# Patient Record
Sex: MALE | Race: WHITE | NOT HISPANIC OR LATINO | Employment: OTHER | URBAN - NONMETROPOLITAN AREA
[De-identification: names, ages, dates, MRNs, and addresses within clinical notes are randomized per-mention and may not be internally consistent; named-entity substitution may affect disease eponyms.]

---

## 2020-10-25 ENCOUNTER — APPOINTMENT (OUTPATIENT)
Dept: GENERAL RADIOLOGY | Facility: HOSPITAL | Age: 80
End: 2020-10-25

## 2020-10-25 ENCOUNTER — HOSPITAL ENCOUNTER (INPATIENT)
Facility: HOSPITAL | Age: 80
LOS: 5 days | Discharge: HOME HEALTH | End: 2020-10-30
Attending: EMERGENCY MEDICINE | Admitting: INTERNAL MEDICINE

## 2020-10-25 DIAGNOSIS — Z74.09 IMPAIRED FUNCTIONAL MOBILITY, BALANCE, GAIT, AND ENDURANCE: ICD-10-CM

## 2020-10-25 DIAGNOSIS — E83.42 HYPOMAGNESEMIA: ICD-10-CM

## 2020-10-25 DIAGNOSIS — Z78.9 IMPAIRED MOBILITY AND ACTIVITIES OF DAILY LIVING: ICD-10-CM

## 2020-10-25 DIAGNOSIS — Z74.09 IMPAIRED MOBILITY AND ACTIVITIES OF DAILY LIVING: ICD-10-CM

## 2020-10-25 DIAGNOSIS — N18.9 CHRONIC KIDNEY DISEASE, UNSPECIFIED CKD STAGE: ICD-10-CM

## 2020-10-25 DIAGNOSIS — D64.9 ANEMIA, UNSPECIFIED TYPE: ICD-10-CM

## 2020-10-25 DIAGNOSIS — R77.8 ELEVATED TROPONIN: ICD-10-CM

## 2020-10-25 DIAGNOSIS — J18.9 PNEUMONIA OF BOTH LUNGS DUE TO INFECTIOUS ORGANISM, UNSPECIFIED PART OF LUNG: Primary | ICD-10-CM

## 2020-10-25 PROBLEM — R06.00 DYSPNEA: Chronic | Status: ACTIVE | Noted: 2020-10-25

## 2020-10-25 LAB
ABO GROUP BLD: NORMAL
ALBUMIN SERPL-MCNC: 3 G/DL (ref 3.5–5.2)
ALBUMIN/GLOB SERPL: 1 G/DL
ALP SERPL-CCNC: 64 U/L (ref 39–117)
ALT SERPL W P-5'-P-CCNC: 12 U/L (ref 1–41)
ANION GAP SERPL CALCULATED.3IONS-SCNC: 12 MMOL/L (ref 5–15)
APTT PPP: 35.1 SECONDS (ref 20–40.3)
AST SERPL-CCNC: 14 U/L (ref 1–40)
BACTERIA UR QL AUTO: ABNORMAL /HPF
BASOPHILS # BLD AUTO: 0.03 10*3/MM3 (ref 0–0.2)
BASOPHILS NFR BLD AUTO: 0.3 % (ref 0–1.5)
BILIRUB SERPL-MCNC: 0.5 MG/DL (ref 0–1.2)
BILIRUB UR QL STRIP: NEGATIVE
BLD GP AB SCN SERPL QL: NEGATIVE
BUN SERPL-MCNC: 33 MG/DL (ref 8–23)
BUN/CREAT SERPL: 17.3 (ref 7–25)
CALCIUM SPEC-SCNC: 8.5 MG/DL (ref 8.6–10.5)
CHLORIDE SERPL-SCNC: 99 MMOL/L (ref 98–107)
CLARITY UR: ABNORMAL
CO2 SERPL-SCNC: 24 MMOL/L (ref 22–29)
COLOR UR: YELLOW
CREAT SERPL-MCNC: 1.91 MG/DL (ref 0.76–1.27)
D-LACTATE SERPL-SCNC: 1 MMOL/L (ref 0.5–2)
DEPRECATED RDW RBC AUTO: 51.7 FL (ref 37–54)
EOSINOPHIL # BLD AUTO: 0.09 10*3/MM3 (ref 0–0.4)
EOSINOPHIL NFR BLD AUTO: 0.9 % (ref 0.3–6.2)
ERYTHROCYTE [DISTWIDTH] IN BLOOD BY AUTOMATED COUNT: 16.1 % (ref 12.3–15.4)
GFR SERPL CREATININE-BSD FRML MDRD: 34 ML/MIN/1.73
GLOBULIN UR ELPH-MCNC: 3.1 GM/DL
GLUCOSE SERPL-MCNC: 191 MG/DL (ref 65–99)
GLUCOSE UR STRIP-MCNC: NEGATIVE MG/DL
HCT VFR BLD AUTO: 27.1 % (ref 37.5–51)
HGB BLD-MCNC: 8.6 G/DL (ref 13–17.7)
HGB UR QL STRIP.AUTO: NEGATIVE
HOLD SPECIMEN: NORMAL
HOLD SPECIMEN: NORMAL
HYALINE CASTS UR QL AUTO: ABNORMAL /LPF
IMM GRANULOCYTES # BLD AUTO: 0.05 10*3/MM3 (ref 0–0.05)
IMM GRANULOCYTES NFR BLD AUTO: 0.5 % (ref 0–0.5)
INR PPP: 1.14 (ref 0.8–1.2)
IRON 24H UR-MRATE: 13 MCG/DL (ref 59–158)
KETONES UR QL STRIP: NEGATIVE
LEUKOCYTE ESTERASE UR QL STRIP.AUTO: NEGATIVE
LIPASE SERPL-CCNC: 18 U/L (ref 13–60)
LYMPHOCYTES # BLD AUTO: 1 10*3/MM3 (ref 0.7–3.1)
LYMPHOCYTES NFR BLD AUTO: 9.6 % (ref 19.6–45.3)
Lab: NORMAL
MAGNESIUM SERPL-MCNC: 1.1 MG/DL (ref 1.6–2.4)
MCH RBC QN AUTO: 27.8 PG (ref 26.6–33)
MCHC RBC AUTO-ENTMCNC: 31.7 G/DL (ref 31.5–35.7)
MCV RBC AUTO: 87.7 FL (ref 79–97)
MONOCYTES # BLD AUTO: 0.44 10*3/MM3 (ref 0.1–0.9)
MONOCYTES NFR BLD AUTO: 4.2 % (ref 5–12)
NEUTROPHILS NFR BLD AUTO: 8.76 10*3/MM3 (ref 1.7–7)
NEUTROPHILS NFR BLD AUTO: 84.5 % (ref 42.7–76)
NITRITE UR QL STRIP: NEGATIVE
NRBC BLD AUTO-RTO: 0 /100 WBC (ref 0–0.2)
NT-PROBNP SERPL-MCNC: 8814 PG/ML (ref 0–1800)
PH UR STRIP.AUTO: <=5 [PH] (ref 5–9)
PLATELET # BLD AUTO: 311 10*3/MM3 (ref 140–450)
PMV BLD AUTO: 10.1 FL (ref 6–12)
POTASSIUM SERPL-SCNC: 4.5 MMOL/L (ref 3.5–5.2)
PROT SERPL-MCNC: 6.1 G/DL (ref 6–8.5)
PROT UR QL STRIP: ABNORMAL
PROTHROMBIN TIME: 15.1 SECONDS (ref 11.1–15.3)
RBC # BLD AUTO: 3.09 10*6/MM3 (ref 4.14–5.8)
RBC # UR: ABNORMAL /HPF
REF LAB TEST METHOD: ABNORMAL
RH BLD: POSITIVE
SARS-COV-2 N GENE RESP QL NAA+PROBE: NOT DETECTED
SODIUM SERPL-SCNC: 135 MMOL/L (ref 136–145)
SP GR UR STRIP: 1.01 (ref 1–1.03)
SQUAMOUS #/AREA URNS HPF: ABNORMAL /HPF
T&S EXPIRATION DATE: NORMAL
TROPONIN T SERPL-MCNC: 0.07 NG/ML (ref 0–0.03)
TSH SERPL DL<=0.05 MIU/L-ACNC: 1.47 UIU/ML (ref 0.27–4.2)
UROBILINOGEN UR QL STRIP: ABNORMAL
WBC # BLD AUTO: 10.37 10*3/MM3 (ref 3.4–10.8)
WBC UR QL AUTO: ABNORMAL /HPF
WHOLE BLOOD HOLD SPECIMEN: NORMAL
WHOLE BLOOD HOLD SPECIMEN: NORMAL

## 2020-10-25 PROCEDURE — 83880 ASSAY OF NATRIURETIC PEPTIDE: CPT | Performed by: EMERGENCY MEDICINE

## 2020-10-25 PROCEDURE — 25010000002 CEFTRIAXONE PER 250 MG: Performed by: INTERNAL MEDICINE

## 2020-10-25 PROCEDURE — 87150 DNA/RNA AMPLIFIED PROBE: CPT | Performed by: EMERGENCY MEDICINE

## 2020-10-25 PROCEDURE — 83690 ASSAY OF LIPASE: CPT | Performed by: EMERGENCY MEDICINE

## 2020-10-25 PROCEDURE — 87040 BLOOD CULTURE FOR BACTERIA: CPT | Performed by: INTERNAL MEDICINE

## 2020-10-25 PROCEDURE — 93005 ELECTROCARDIOGRAM TRACING: CPT | Performed by: EMERGENCY MEDICINE

## 2020-10-25 PROCEDURE — 36415 COLL VENOUS BLD VENIPUNCTURE: CPT | Performed by: EMERGENCY MEDICINE

## 2020-10-25 PROCEDURE — 85730 THROMBOPLASTIN TIME PARTIAL: CPT | Performed by: EMERGENCY MEDICINE

## 2020-10-25 PROCEDURE — 71045 X-RAY EXAM CHEST 1 VIEW: CPT

## 2020-10-25 PROCEDURE — 82962 GLUCOSE BLOOD TEST: CPT

## 2020-10-25 PROCEDURE — 86901 BLOOD TYPING SEROLOGIC RH(D): CPT | Performed by: EMERGENCY MEDICINE

## 2020-10-25 PROCEDURE — 85610 PROTHROMBIN TIME: CPT | Performed by: EMERGENCY MEDICINE

## 2020-10-25 PROCEDURE — 85025 COMPLETE CBC W/AUTO DIFF WBC: CPT

## 2020-10-25 PROCEDURE — 81001 URINALYSIS AUTO W/SCOPE: CPT | Performed by: EMERGENCY MEDICINE

## 2020-10-25 PROCEDURE — 87186 SC STD MICRODIL/AGAR DIL: CPT | Performed by: EMERGENCY MEDICINE

## 2020-10-25 PROCEDURE — 87635 SARS-COV-2 COVID-19 AMP PRB: CPT | Performed by: EMERGENCY MEDICINE

## 2020-10-25 PROCEDURE — 84443 ASSAY THYROID STIM HORMONE: CPT | Performed by: INTERNAL MEDICINE

## 2020-10-25 PROCEDURE — 86850 RBC ANTIBODY SCREEN: CPT | Performed by: EMERGENCY MEDICINE

## 2020-10-25 PROCEDURE — 83605 ASSAY OF LACTIC ACID: CPT | Performed by: EMERGENCY MEDICINE

## 2020-10-25 PROCEDURE — 63710000001 INSULIN ASPART PER 5 UNITS: Performed by: INTERNAL MEDICINE

## 2020-10-25 PROCEDURE — 25010000002 ALBUMIN HUMAN 25% PER 50 ML: Performed by: INTERNAL MEDICINE

## 2020-10-25 PROCEDURE — 83540 ASSAY OF IRON: CPT | Performed by: INTERNAL MEDICINE

## 2020-10-25 PROCEDURE — 84484 ASSAY OF TROPONIN QUANT: CPT | Performed by: EMERGENCY MEDICINE

## 2020-10-25 PROCEDURE — 25010000002 MAGNESIUM SULFATE 2 GM/50ML SOLUTION: Performed by: EMERGENCY MEDICINE

## 2020-10-25 PROCEDURE — 87040 BLOOD CULTURE FOR BACTERIA: CPT | Performed by: EMERGENCY MEDICINE

## 2020-10-25 PROCEDURE — 93010 ELECTROCARDIOGRAM REPORT: CPT | Performed by: INTERNAL MEDICINE

## 2020-10-25 PROCEDURE — P9047 ALBUMIN (HUMAN), 25%, 50ML: HCPCS | Performed by: INTERNAL MEDICINE

## 2020-10-25 PROCEDURE — 86900 BLOOD TYPING SEROLOGIC ABO: CPT | Performed by: EMERGENCY MEDICINE

## 2020-10-25 PROCEDURE — 86900 BLOOD TYPING SEROLOGIC ABO: CPT

## 2020-10-25 PROCEDURE — 86901 BLOOD TYPING SEROLOGIC RH(D): CPT

## 2020-10-25 PROCEDURE — 94799 UNLISTED PULMONARY SVC/PX: CPT

## 2020-10-25 PROCEDURE — 83735 ASSAY OF MAGNESIUM: CPT | Performed by: EMERGENCY MEDICINE

## 2020-10-25 PROCEDURE — 25010000002 AZITHROMYCIN 500 MG/250 ML: Performed by: EMERGENCY MEDICINE

## 2020-10-25 PROCEDURE — 25010000002 FUROSEMIDE PER 20 MG: Performed by: INTERNAL MEDICINE

## 2020-10-25 PROCEDURE — 80053 COMPREHEN METABOLIC PANEL: CPT | Performed by: EMERGENCY MEDICINE

## 2020-10-25 PROCEDURE — 99284 EMERGENCY DEPT VISIT MOD MDM: CPT

## 2020-10-25 RX ORDER — TAMSULOSIN HYDROCHLORIDE 0.4 MG/1
0.4 CAPSULE ORAL
COMMUNITY

## 2020-10-25 RX ORDER — NYSTATIN 100000 [USP'U]/G
POWDER TOPICAL EVERY 12 HOURS SCHEDULED
Status: DISCONTINUED | OUTPATIENT
Start: 2020-10-25 | End: 2020-10-30 | Stop reason: HOSPADM

## 2020-10-25 RX ORDER — MULTIVITAMIN
1 CAPSULE ORAL
COMMUNITY

## 2020-10-25 RX ORDER — NICOTINE POLACRILEX 4 MG
15 LOZENGE BUCCAL
Status: DISCONTINUED | OUTPATIENT
Start: 2020-10-25 | End: 2020-10-30 | Stop reason: HOSPADM

## 2020-10-25 RX ORDER — ASPIRIN 81 MG/1
81 TABLET, CHEWABLE ORAL ONCE
Status: COMPLETED | OUTPATIENT
Start: 2020-10-26 | End: 2020-10-26

## 2020-10-25 RX ORDER — SODIUM CHLORIDE 0.9 % (FLUSH) 0.9 %
10 SYRINGE (ML) INJECTION AS NEEDED
Status: DISCONTINUED | OUTPATIENT
Start: 2020-10-25 | End: 2020-10-30 | Stop reason: HOSPADM

## 2020-10-25 RX ORDER — FLUTICASONE PROPIONATE 220 UG/1
1 AEROSOL, METERED RESPIRATORY (INHALATION)
COMMUNITY

## 2020-10-25 RX ORDER — SODIUM CHLORIDE 9 MG/ML
75 INJECTION, SOLUTION INTRAVENOUS CONTINUOUS
Status: DISCONTINUED | OUTPATIENT
Start: 2020-10-25 | End: 2020-10-25

## 2020-10-25 RX ORDER — LOSARTAN POTASSIUM 25 MG/1
25 TABLET ORAL
COMMUNITY

## 2020-10-25 RX ORDER — TORSEMIDE 10 MG/1
10 TABLET ORAL
COMMUNITY

## 2020-10-25 RX ORDER — THIAMINE MONONITRATE (VIT B1) 100 MG
100 TABLET ORAL DAILY
COMMUNITY

## 2020-10-25 RX ORDER — ASPIRIN 81 MG/1
324 TABLET, CHEWABLE ORAL ONCE
Status: COMPLETED | OUTPATIENT
Start: 2020-10-25 | End: 2020-10-25

## 2020-10-25 RX ORDER — SIMVASTATIN 20 MG
20 TABLET ORAL
COMMUNITY

## 2020-10-25 RX ORDER — SODIUM CHLORIDE 9 MG/ML
INJECTION, SOLUTION INTRAVENOUS
Status: COMPLETED
Start: 2020-10-25 | End: 2020-10-25

## 2020-10-25 RX ORDER — MAGNESIUM SULFATE HEPTAHYDRATE 40 MG/ML
2 INJECTION, SOLUTION INTRAVENOUS ONCE
Status: COMPLETED | OUTPATIENT
Start: 2020-10-25 | End: 2020-10-25

## 2020-10-25 RX ORDER — TAMSULOSIN HYDROCHLORIDE 0.4 MG/1
0.4 CAPSULE ORAL DAILY
Status: DISCONTINUED | OUTPATIENT
Start: 2020-10-25 | End: 2020-10-30 | Stop reason: HOSPADM

## 2020-10-25 RX ORDER — PANTOPRAZOLE SODIUM 40 MG/1
40 TABLET, DELAYED RELEASE ORAL
COMMUNITY
Start: 2020-03-13 | End: 2021-03-14

## 2020-10-25 RX ORDER — ALBUMIN (HUMAN) 12.5 G/50ML
12.5 SOLUTION INTRAVENOUS ONCE
Status: COMPLETED | OUTPATIENT
Start: 2020-10-25 | End: 2020-10-25

## 2020-10-25 RX ORDER — SODIUM CHLORIDE 0.9 % (FLUSH) 0.9 %
10 SYRINGE (ML) INJECTION EVERY 12 HOURS SCHEDULED
Status: DISCONTINUED | OUTPATIENT
Start: 2020-10-25 | End: 2020-10-30 | Stop reason: HOSPADM

## 2020-10-25 RX ORDER — ONDANSETRON 2 MG/ML
4 INJECTION INTRAMUSCULAR; INTRAVENOUS EVERY 6 HOURS PRN
Status: DISCONTINUED | OUTPATIENT
Start: 2020-10-25 | End: 2020-10-30 | Stop reason: HOSPADM

## 2020-10-25 RX ORDER — LEVALBUTEROL TARTRATE 45 UG/1
1-2 AEROSOL, METERED ORAL EVERY 6 HOURS PRN
COMMUNITY

## 2020-10-25 RX ORDER — DEXTROSE MONOHYDRATE 25 G/50ML
25 INJECTION, SOLUTION INTRAVENOUS
Status: DISCONTINUED | OUTPATIENT
Start: 2020-10-25 | End: 2020-10-30 | Stop reason: HOSPADM

## 2020-10-25 RX ORDER — CODEINE PHOSPHATE AND GUAIFENESIN 10; 100 MG/5ML; MG/5ML
5 SOLUTION ORAL 3 TIMES DAILY PRN
COMMUNITY
End: 2020-11-03 | Stop reason: SDUPTHER

## 2020-10-25 RX ORDER — NITROGLYCERIN 0.4 MG/1
0.4 TABLET SUBLINGUAL
COMMUNITY

## 2020-10-25 RX ORDER — FUROSEMIDE 10 MG/ML
20 INJECTION INTRAMUSCULAR; INTRAVENOUS ONCE
Status: COMPLETED | OUTPATIENT
Start: 2020-10-25 | End: 2020-10-25

## 2020-10-25 RX ADMIN — SODIUM CHLORIDE 75 ML/HR: 900 INJECTION, SOLUTION INTRAVENOUS at 14:20

## 2020-10-25 RX ADMIN — SODIUM CHLORIDE, PRESERVATIVE FREE 10 ML: 5 INJECTION INTRAVENOUS at 20:18

## 2020-10-25 RX ADMIN — SODIUM CHLORIDE 500 ML: 9 INJECTION, SOLUTION INTRAVENOUS at 15:29

## 2020-10-25 RX ADMIN — ALBUMIN HUMAN 12.5 G: 0.25 SOLUTION INTRAVENOUS at 21:46

## 2020-10-25 RX ADMIN — FUROSEMIDE 20 MG: 10 INJECTION, SOLUTION INTRAMUSCULAR; INTRAVENOUS at 21:44

## 2020-10-25 RX ADMIN — SODIUM CHLORIDE 250 ML: 9 INJECTION, SOLUTION INTRAVENOUS at 20:49

## 2020-10-25 RX ADMIN — Medication: at 22:00

## 2020-10-25 RX ADMIN — MAGNESIUM SULFATE HEPTAHYDRATE 2 G: 40 INJECTION, SOLUTION INTRAVENOUS at 14:20

## 2020-10-25 RX ADMIN — AZITHROMYCIN 500 MG: 500 INJECTION, POWDER, LYOPHILIZED, FOR SOLUTION INTRAVENOUS at 15:09

## 2020-10-25 RX ADMIN — NYSTATIN: 100000 POWDER TOPICAL at 20:17

## 2020-10-25 RX ADMIN — INSULIN ASPART 4 UNITS: 100 INJECTION, SOLUTION INTRAVENOUS; SUBCUTANEOUS at 18:15

## 2020-10-25 RX ADMIN — CEFTRIAXONE SODIUM 1 G: 1 INJECTION, POWDER, FOR SOLUTION INTRAMUSCULAR; INTRAVENOUS at 20:49

## 2020-10-25 RX ADMIN — ASPIRIN 81 MG 324 MG: 81 TABLET ORAL at 14:20

## 2020-10-25 RX ADMIN — TAMSULOSIN HYDROCHLORIDE 0.4 MG: 0.4 CAPSULE ORAL at 18:15

## 2020-10-26 ENCOUNTER — APPOINTMENT (OUTPATIENT)
Dept: CARDIOLOGY | Facility: HOSPITAL | Age: 80
End: 2020-10-26

## 2020-10-26 LAB
ANION GAP SERPL CALCULATED.3IONS-SCNC: 13 MMOL/L (ref 5–15)
BACTERIA BLD CULT: ABNORMAL
BH CV ECHO MEAS - AI DEC SLOPE: 936.5 CM/SEC^2
BH CV ECHO MEAS - AI MAX PG: 107.7 MMHG
BH CV ECHO MEAS - AI MAX VEL: 519 CM/SEC
BH CV ECHO MEAS - AI P1/2T: 162.3 MSEC
BH CV ECHO MEAS - AO MAX PG (FULL): 69.1 MMHG
BH CV ECHO MEAS - AO MAX PG: 72.9 MMHG
BH CV ECHO MEAS - AO MEAN PG (FULL): 45 MMHG
BH CV ECHO MEAS - AO MEAN PG: 47 MMHG
BH CV ECHO MEAS - AO ROOT AREA (BSA CORRECTED): 1.3
BH CV ECHO MEAS - AO ROOT AREA: 4.9 CM^2
BH CV ECHO MEAS - AO ROOT DIAM: 2.5 CM
BH CV ECHO MEAS - AO V2 MAX: 427 CM/SEC
BH CV ECHO MEAS - AO V2 MEAN: 327 CM/SEC
BH CV ECHO MEAS - AO V2 VTI: 93.9 CM
BH CV ECHO MEAS - AVA(I,A): 0.94 CM^2
BH CV ECHO MEAS - AVA(I,D): 0.94 CM^2
BH CV ECHO MEAS - AVA(V,A): 0.87 CM^2
BH CV ECHO MEAS - AVA(V,D): 0.87 CM^2
BH CV ECHO MEAS - BSA(HAYCOCK): 2 M^2
BH CV ECHO MEAS - BSA: 2 M^2
BH CV ECHO MEAS - BZI_BMI: 25.3 KILOGRAMS/M^2
BH CV ECHO MEAS - BZI_METRIC_HEIGHT: 177.8 CM
BH CV ECHO MEAS - BZI_METRIC_WEIGHT: 79.8 KG
BH CV ECHO MEAS - EDV(CUBED): 103.8 ML
BH CV ECHO MEAS - EDV(MOD-SP2): 85.8 ML
BH CV ECHO MEAS - EDV(MOD-SP4): 71.6 ML
BH CV ECHO MEAS - EDV(TEICH): 102.4 ML
BH CV ECHO MEAS - EF(CUBED): 64.8 %
BH CV ECHO MEAS - EF(MOD-SP2): 64 %
BH CV ECHO MEAS - EF(MOD-SP4): 61 %
BH CV ECHO MEAS - EF(TEICH): 56.2 %
BH CV ECHO MEAS - ESV(CUBED): 36.6 ML
BH CV ECHO MEAS - ESV(MOD-SP2): 30.9 ML
BH CV ECHO MEAS - ESV(MOD-SP4): 27.9 ML
BH CV ECHO MEAS - ESV(TEICH): 44.8 ML
BH CV ECHO MEAS - FS: 29.4 %
BH CV ECHO MEAS - IVS/LVPW: 0.78
BH CV ECHO MEAS - IVSD: 1.5 CM
BH CV ECHO MEAS - LA DIMENSION: 4.1 CM
BH CV ECHO MEAS - LA/AO: 1.6
BH CV ECHO MEAS - LV DIASTOLIC VOL/BSA (35-75): 36.2 ML/M^2
BH CV ECHO MEAS - LV MASS(C)D: 341.8 GRAMS
BH CV ECHO MEAS - LV MASS(C)DI: 172.9 GRAMS/M^2
BH CV ECHO MEAS - LV MAX PG: 3.8 MMHG
BH CV ECHO MEAS - LV MEAN PG: 2 MMHG
BH CV ECHO MEAS - LV SYSTOLIC VOL/BSA (12-30): 14.1 ML/M^2
BH CV ECHO MEAS - LV V1 MAX: 97.9 CM/SEC
BH CV ECHO MEAS - LV V1 MEAN: 63.8 CM/SEC
BH CV ECHO MEAS - LV V1 VTI: 23.1 CM
BH CV ECHO MEAS - LVIDD: 4.7 CM
BH CV ECHO MEAS - LVIDS: 3.3 CM
BH CV ECHO MEAS - LVLD AP2: 6.9 CM
BH CV ECHO MEAS - LVLD AP4: 6.5 CM
BH CV ECHO MEAS - LVLS AP2: 5.4 CM
BH CV ECHO MEAS - LVLS AP4: 5.6 CM
BH CV ECHO MEAS - LVOT AREA (M): 3.8 CM^2
BH CV ECHO MEAS - LVOT AREA: 3.8 CM^2
BH CV ECHO MEAS - LVOT DIAM: 2.2 CM
BH CV ECHO MEAS - LVPWD: 1.9 CM
BH CV ECHO MEAS - MR MAX PG: 117.1 MMHG
BH CV ECHO MEAS - MR MAX VEL: 541 CM/SEC
BH CV ECHO MEAS - MV A MAX VEL: 73.5 CM/SEC
BH CV ECHO MEAS - MV DEC SLOPE: 877 CM/SEC^2
BH CV ECHO MEAS - MV E MAX VEL: 171 CM/SEC
BH CV ECHO MEAS - MV E/A: 2.3
BH CV ECHO MEAS - MV MAX PG: 16.5 MMHG
BH CV ECHO MEAS - MV MEAN PG: 8 MMHG
BH CV ECHO MEAS - MV P1/2T MAX VEL: 182 CM/SEC
BH CV ECHO MEAS - MV P1/2T: 60.8 MSEC
BH CV ECHO MEAS - MV V2 MAX: 203 CM/SEC
BH CV ECHO MEAS - MV V2 MEAN: 129 CM/SEC
BH CV ECHO MEAS - MV V2 VTI: 54.9 CM
BH CV ECHO MEAS - MVA P1/2T LCG: 1.2 CM^2
BH CV ECHO MEAS - MVA(P1/2T): 3.6 CM^2
BH CV ECHO MEAS - MVA(VTI): 1.6 CM^2
BH CV ECHO MEAS - PA MAX PG (FULL): 0.4 MMHG
BH CV ECHO MEAS - PA MAX PG: 2.6 MMHG
BH CV ECHO MEAS - PA V2 MAX: 80.8 CM/SEC
BH CV ECHO MEAS - RAP SYSTOLE: 10 MMHG
BH CV ECHO MEAS - RV MAX PG: 2.2 MMHG
BH CV ECHO MEAS - RV MEAN PG: 1 MMHG
BH CV ECHO MEAS - RV V1 MAX: 74.4 CM/SEC
BH CV ECHO MEAS - RV V1 MEAN: 48.5 CM/SEC
BH CV ECHO MEAS - RV V1 VTI: 13.2 CM
BH CV ECHO MEAS - RVDD: 3.5 CM
BH CV ECHO MEAS - RVSP: 58.2 MMHG
BH CV ECHO MEAS - SI(AO): 233.2 ML/M^2
BH CV ECHO MEAS - SI(CUBED): 34 ML/M^2
BH CV ECHO MEAS - SI(LVOT): 44.4 ML/M^2
BH CV ECHO MEAS - SI(MOD-SP2): 27.8 ML/M^2
BH CV ECHO MEAS - SI(MOD-SP4): 22.1 ML/M^2
BH CV ECHO MEAS - SI(TEICH): 29.1 ML/M^2
BH CV ECHO MEAS - SV(AO): 460.9 ML
BH CV ECHO MEAS - SV(CUBED): 67.2 ML
BH CV ECHO MEAS - SV(LVOT): 87.8 ML
BH CV ECHO MEAS - SV(MOD-SP2): 54.9 ML
BH CV ECHO MEAS - SV(MOD-SP4): 43.7 ML
BH CV ECHO MEAS - SV(TEICH): 57.6 ML
BH CV ECHO MEAS - TR MAX VEL: 347 CM/SEC
BUN SERPL-MCNC: 25 MG/DL (ref 8–23)
BUN/CREAT SERPL: 16.9 (ref 7–25)
CALCIUM SPEC-SCNC: 8.4 MG/DL (ref 8.6–10.5)
CHLORIDE SERPL-SCNC: 101 MMOL/L (ref 98–107)
CO2 SERPL-SCNC: 24 MMOL/L (ref 22–29)
CREAT SERPL-MCNC: 1.48 MG/DL (ref 0.76–1.27)
DEPRECATED RDW RBC AUTO: 51.3 FL (ref 37–54)
ERYTHROCYTE [DISTWIDTH] IN BLOOD BY AUTOMATED COUNT: 16.1 % (ref 12.3–15.4)
GFR SERPL CREATININE-BSD FRML MDRD: 46 ML/MIN/1.73
GLUCOSE BLDC GLUCOMTR-MCNC: 176 MG/DL (ref 70–130)
GLUCOSE BLDC GLUCOMTR-MCNC: 177 MG/DL (ref 70–130)
GLUCOSE BLDC GLUCOMTR-MCNC: 199 MG/DL (ref 70–130)
GLUCOSE BLDC GLUCOMTR-MCNC: 201 MG/DL (ref 70–130)
GLUCOSE BLDC GLUCOMTR-MCNC: 215 MG/DL (ref 70–130)
GLUCOSE BLDC GLUCOMTR-MCNC: 226 MG/DL (ref 70–130)
GLUCOSE SERPL-MCNC: 199 MG/DL (ref 65–99)
HCT VFR BLD AUTO: 27.3 % (ref 37.5–51)
HGB BLD-MCNC: 8.5 G/DL (ref 13–17.7)
LV EF 2D ECHO EST: 61 %
MAGNESIUM SERPL-MCNC: 1.6 MG/DL (ref 1.6–2.4)
MAXIMAL PREDICTED HEART RATE: 140 BPM
MCH RBC QN AUTO: 27.5 PG (ref 26.6–33)
MCHC RBC AUTO-ENTMCNC: 31.1 G/DL (ref 31.5–35.7)
MCV RBC AUTO: 88.3 FL (ref 79–97)
PLATELET # BLD AUTO: 300 10*3/MM3 (ref 140–450)
PMV BLD AUTO: 10.8 FL (ref 6–12)
POTASSIUM SERPL-SCNC: 4.2 MMOL/L (ref 3.5–5.2)
RBC # BLD AUTO: 3.09 10*6/MM3 (ref 4.14–5.8)
SODIUM SERPL-SCNC: 138 MMOL/L (ref 136–145)
STRESS TARGET HR: 119 BPM
WBC # BLD AUTO: 10.04 10*3/MM3 (ref 3.4–10.8)

## 2020-10-26 PROCEDURE — 25010000002 FUROSEMIDE PER 20 MG: Performed by: INTERNAL MEDICINE

## 2020-10-26 PROCEDURE — 63710000001 INSULIN ASPART PER 5 UNITS: Performed by: INTERNAL MEDICINE

## 2020-10-26 PROCEDURE — 82962 GLUCOSE BLOOD TEST: CPT

## 2020-10-26 PROCEDURE — 93306 TTE W/DOPPLER COMPLETE: CPT | Performed by: INTERNAL MEDICINE

## 2020-10-26 PROCEDURE — 25010000002 NA FERRIC GLUC CPLX PER 12.5 MG: Performed by: INTERNAL MEDICINE

## 2020-10-26 PROCEDURE — 97166 OT EVAL MOD COMPLEX 45 MIN: CPT

## 2020-10-26 PROCEDURE — 80048 BASIC METABOLIC PNL TOTAL CA: CPT | Performed by: INTERNAL MEDICINE

## 2020-10-26 PROCEDURE — 25010000002 CEFTRIAXONE PER 250 MG: Performed by: INTERNAL MEDICINE

## 2020-10-26 PROCEDURE — 93306 TTE W/DOPPLER COMPLETE: CPT

## 2020-10-26 PROCEDURE — 25010000002 AZITHROMYCIN PER 500 MG: Performed by: INTERNAL MEDICINE

## 2020-10-26 PROCEDURE — 97162 PT EVAL MOD COMPLEX 30 MIN: CPT

## 2020-10-26 PROCEDURE — 83735 ASSAY OF MAGNESIUM: CPT | Performed by: INTERNAL MEDICINE

## 2020-10-26 PROCEDURE — 85027 COMPLETE CBC AUTOMATED: CPT | Performed by: INTERNAL MEDICINE

## 2020-10-26 PROCEDURE — 25010000002 ENOXAPARIN PER 10 MG: Performed by: INTERNAL MEDICINE

## 2020-10-26 RX ORDER — FUROSEMIDE 10 MG/ML
20 INJECTION INTRAMUSCULAR; INTRAVENOUS ONCE
Status: DISCONTINUED | OUTPATIENT
Start: 2020-10-27 | End: 2020-10-26

## 2020-10-26 RX ORDER — FUROSEMIDE 10 MG/ML
20 INJECTION INTRAMUSCULAR; INTRAVENOUS DAILY
Status: COMPLETED | OUTPATIENT
Start: 2020-10-26 | End: 2020-10-27

## 2020-10-26 RX ORDER — DONEPEZIL HYDROCHLORIDE 5 MG/1
5 TABLET, FILM COATED ORAL NIGHTLY
Status: DISCONTINUED | OUTPATIENT
Start: 2020-10-26 | End: 2020-10-30 | Stop reason: HOSPADM

## 2020-10-26 RX ORDER — PANTOPRAZOLE SODIUM 40 MG/1
40 TABLET, DELAYED RELEASE ORAL
Status: DISCONTINUED | OUTPATIENT
Start: 2020-10-26 | End: 2020-10-30 | Stop reason: HOSPADM

## 2020-10-26 RX ORDER — SENNA PLUS 8.6 MG/1
1 TABLET ORAL NIGHTLY
Status: DISCONTINUED | OUTPATIENT
Start: 2020-10-26 | End: 2020-10-30 | Stop reason: HOSPADM

## 2020-10-26 RX ADMIN — TAMSULOSIN HYDROCHLORIDE 0.4 MG: 0.4 CAPSULE ORAL at 08:07

## 2020-10-26 RX ADMIN — Medication: at 20:56

## 2020-10-26 RX ADMIN — INSULIN ASPART 2 UNITS: 100 INJECTION, SOLUTION INTRAVENOUS; SUBCUTANEOUS at 08:07

## 2020-10-26 RX ADMIN — SODIUM CHLORIDE, PRESERVATIVE FREE 10 ML: 5 INJECTION INTRAVENOUS at 08:08

## 2020-10-26 RX ADMIN — DONEPEZIL HYDROCHLORIDE 5 MG: 5 TABLET, FILM COATED ORAL at 20:56

## 2020-10-26 RX ADMIN — INSULIN ASPART 4 UNITS: 100 INJECTION, SOLUTION INTRAVENOUS; SUBCUTANEOUS at 17:12

## 2020-10-26 RX ADMIN — Medication: at 08:08

## 2020-10-26 RX ADMIN — PANTOPRAZOLE SODIUM 40 MG: 40 TABLET, DELAYED RELEASE ORAL at 11:30

## 2020-10-26 RX ADMIN — AZITHROMYCIN MONOHYDRATE 500 MG: 500 INJECTION, POWDER, LYOPHILIZED, FOR SOLUTION INTRAVENOUS at 06:28

## 2020-10-26 RX ADMIN — CEFTRIAXONE SODIUM 1 G: 1 INJECTION, POWDER, FOR SOLUTION INTRAMUSCULAR; INTRAVENOUS at 08:07

## 2020-10-26 RX ADMIN — FUROSEMIDE 20 MG: 10 INJECTION, SOLUTION INTRAMUSCULAR; INTRAVENOUS at 16:33

## 2020-10-26 RX ADMIN — INSULIN ASPART 2 UNITS: 100 INJECTION, SOLUTION INTRAVENOUS; SUBCUTANEOUS at 11:51

## 2020-10-26 RX ADMIN — ASPIRIN 81 MG: 81 TABLET, CHEWABLE ORAL at 06:28

## 2020-10-26 RX ADMIN — SENNOSIDES 1 TABLET: 8.6 TABLET, FILM COATED ORAL at 20:56

## 2020-10-26 RX ADMIN — SODIUM CHLORIDE 125 MG: 9 INJECTION, SOLUTION INTRAVENOUS at 13:36

## 2020-10-26 RX ADMIN — ENOXAPARIN SODIUM 40 MG: 40 INJECTION SUBCUTANEOUS at 11:31

## 2020-10-26 RX ADMIN — NYSTATIN: 100000 POWDER TOPICAL at 20:56

## 2020-10-26 RX ADMIN — NYSTATIN: 100000 POWDER TOPICAL at 08:08

## 2020-10-26 RX ADMIN — SODIUM CHLORIDE, PRESERVATIVE FREE 10 ML: 5 INJECTION INTRAVENOUS at 20:56

## 2020-10-27 LAB
ANION GAP SERPL CALCULATED.3IONS-SCNC: 13 MMOL/L (ref 5–15)
BASOPHILS # BLD AUTO: 0.03 10*3/MM3 (ref 0–0.2)
BASOPHILS NFR BLD AUTO: 0.4 % (ref 0–1.5)
BUN SERPL-MCNC: 21 MG/DL (ref 8–23)
BUN/CREAT SERPL: 15.7 (ref 7–25)
CALCIUM SPEC-SCNC: 8.5 MG/DL (ref 8.6–10.5)
CHLORIDE SERPL-SCNC: 103 MMOL/L (ref 98–107)
CO2 SERPL-SCNC: 22 MMOL/L (ref 22–29)
CREAT SERPL-MCNC: 1.34 MG/DL (ref 0.76–1.27)
DEPRECATED RDW RBC AUTO: 48.7 FL (ref 37–54)
EOSINOPHIL # BLD AUTO: 0.17 10*3/MM3 (ref 0–0.4)
EOSINOPHIL NFR BLD AUTO: 2 % (ref 0.3–6.2)
ERYTHROCYTE [DISTWIDTH] IN BLOOD BY AUTOMATED COUNT: 15.9 % (ref 12.3–15.4)
GFR SERPL CREATININE-BSD FRML MDRD: 51 ML/MIN/1.73
GLUCOSE BLDC GLUCOMTR-MCNC: 190 MG/DL (ref 70–130)
GLUCOSE BLDC GLUCOMTR-MCNC: 200 MG/DL (ref 70–130)
GLUCOSE BLDC GLUCOMTR-MCNC: 233 MG/DL (ref 70–130)
GLUCOSE BLDC GLUCOMTR-MCNC: 243 MG/DL (ref 70–130)
GLUCOSE SERPL-MCNC: 226 MG/DL (ref 65–99)
HCT VFR BLD AUTO: 26.2 % (ref 37.5–51)
HEMOCCULT STL QL: NEGATIVE
HGB BLD-MCNC: 8.6 G/DL (ref 13–17.7)
IMM GRANULOCYTES # BLD AUTO: 0.06 10*3/MM3 (ref 0–0.05)
IMM GRANULOCYTES NFR BLD AUTO: 0.7 % (ref 0–0.5)
LYMPHOCYTES # BLD AUTO: 0.75 10*3/MM3 (ref 0.7–3.1)
LYMPHOCYTES NFR BLD AUTO: 8.9 % (ref 19.6–45.3)
MCH RBC QN AUTO: 27.9 PG (ref 26.6–33)
MCHC RBC AUTO-ENTMCNC: 32.8 G/DL (ref 31.5–35.7)
MCV RBC AUTO: 85.1 FL (ref 79–97)
MONOCYTES # BLD AUTO: 0.4 10*3/MM3 (ref 0.1–0.9)
MONOCYTES NFR BLD AUTO: 4.7 % (ref 5–12)
NEUTROPHILS NFR BLD AUTO: 7.05 10*3/MM3 (ref 1.7–7)
NEUTROPHILS NFR BLD AUTO: 83.3 % (ref 42.7–76)
NRBC BLD AUTO-RTO: 0 /100 WBC (ref 0–0.2)
PLATELET # BLD AUTO: 301 10*3/MM3 (ref 140–450)
PMV BLD AUTO: 11 FL (ref 6–12)
POTASSIUM SERPL-SCNC: 3.5 MMOL/L (ref 3.5–5.2)
RBC # BLD AUTO: 3.08 10*6/MM3 (ref 4.14–5.8)
SODIUM SERPL-SCNC: 138 MMOL/L (ref 136–145)
WBC # BLD AUTO: 8.46 10*3/MM3 (ref 3.4–10.8)

## 2020-10-27 PROCEDURE — 82272 OCCULT BLD FECES 1-3 TESTS: CPT | Performed by: INTERNAL MEDICINE

## 2020-10-27 PROCEDURE — 97110 THERAPEUTIC EXERCISES: CPT

## 2020-10-27 PROCEDURE — 25010000002 NA FERRIC GLUC CPLX PER 12.5 MG: Performed by: INTERNAL MEDICINE

## 2020-10-27 PROCEDURE — 25010000002 CEFTRIAXONE PER 250 MG: Performed by: INTERNAL MEDICINE

## 2020-10-27 PROCEDURE — 85025 COMPLETE CBC W/AUTO DIFF WBC: CPT | Performed by: INTERNAL MEDICINE

## 2020-10-27 PROCEDURE — 97530 THERAPEUTIC ACTIVITIES: CPT

## 2020-10-27 PROCEDURE — 25010000002 AZITHROMYCIN PER 500 MG: Performed by: INTERNAL MEDICINE

## 2020-10-27 PROCEDURE — 63710000001 INSULIN ASPART PER 5 UNITS: Performed by: INTERNAL MEDICINE

## 2020-10-27 PROCEDURE — 97116 GAIT TRAINING THERAPY: CPT

## 2020-10-27 PROCEDURE — 82962 GLUCOSE BLOOD TEST: CPT

## 2020-10-27 PROCEDURE — 25010000002 ENOXAPARIN PER 10 MG: Performed by: INTERNAL MEDICINE

## 2020-10-27 PROCEDURE — 25010000002 FUROSEMIDE PER 20 MG: Performed by: INTERNAL MEDICINE

## 2020-10-27 PROCEDURE — 80048 BASIC METABOLIC PNL TOTAL CA: CPT | Performed by: INTERNAL MEDICINE

## 2020-10-27 RX ADMIN — CEFTRIAXONE SODIUM 2 G: 2 INJECTION, POWDER, FOR SOLUTION INTRAMUSCULAR; INTRAVENOUS at 07:59

## 2020-10-27 RX ADMIN — NYSTATIN: 100000 POWDER TOPICAL at 20:24

## 2020-10-27 RX ADMIN — SODIUM CHLORIDE 125 MG: 9 INJECTION, SOLUTION INTRAVENOUS at 11:52

## 2020-10-27 RX ADMIN — TAMSULOSIN HYDROCHLORIDE 0.4 MG: 0.4 CAPSULE ORAL at 07:59

## 2020-10-27 RX ADMIN — ENOXAPARIN SODIUM 40 MG: 40 INJECTION SUBCUTANEOUS at 07:59

## 2020-10-27 RX ADMIN — SODIUM CHLORIDE, PRESERVATIVE FREE 10 ML: 5 INJECTION INTRAVENOUS at 20:18

## 2020-10-27 RX ADMIN — NITROGLYCERIN 1 INCH: 20 OINTMENT TOPICAL at 16:59

## 2020-10-27 RX ADMIN — AZITHROMYCIN MONOHYDRATE 500 MG: 500 INJECTION, POWDER, LYOPHILIZED, FOR SOLUTION INTRAVENOUS at 06:12

## 2020-10-27 RX ADMIN — INSULIN ASPART 4 UNITS: 100 INJECTION, SOLUTION INTRAVENOUS; SUBCUTANEOUS at 07:59

## 2020-10-27 RX ADMIN — DONEPEZIL HYDROCHLORIDE 5 MG: 5 TABLET, FILM COATED ORAL at 20:19

## 2020-10-27 RX ADMIN — Medication: at 20:24

## 2020-10-27 RX ADMIN — SODIUM CHLORIDE, PRESERVATIVE FREE 10 ML: 5 INJECTION INTRAVENOUS at 08:00

## 2020-10-27 RX ADMIN — NITROGLYCERIN 1 INCH: 20 OINTMENT TOPICAL at 04:04

## 2020-10-27 RX ADMIN — INSULIN ASPART 4 UNITS: 100 INJECTION, SOLUTION INTRAVENOUS; SUBCUTANEOUS at 11:51

## 2020-10-27 RX ADMIN — NYSTATIN: 100000 POWDER TOPICAL at 08:00

## 2020-10-27 RX ADMIN — INSULIN ASPART 4 UNITS: 100 INJECTION, SOLUTION INTRAVENOUS; SUBCUTANEOUS at 16:58

## 2020-10-27 RX ADMIN — PANTOPRAZOLE SODIUM 40 MG: 40 TABLET, DELAYED RELEASE ORAL at 06:12

## 2020-10-27 RX ADMIN — METOPROLOL TARTRATE 25 MG: 25 TABLET, FILM COATED ORAL at 11:03

## 2020-10-27 RX ADMIN — Medication: at 08:00

## 2020-10-27 RX ADMIN — NITROGLYCERIN 1 INCH: 20 OINTMENT TOPICAL at 11:51

## 2020-10-27 RX ADMIN — METOPROLOL TARTRATE 25 MG: 25 TABLET, FILM COATED ORAL at 20:19

## 2020-10-27 RX ADMIN — FUROSEMIDE 20 MG: 10 INJECTION, SOLUTION INTRAMUSCULAR; INTRAVENOUS at 16:58

## 2020-10-28 LAB
ANION GAP SERPL CALCULATED.3IONS-SCNC: 11 MMOL/L (ref 5–15)
BACTERIA SPEC AEROBE CULT: ABNORMAL
BACTERIA SPEC AEROBE CULT: ABNORMAL
BASOPHILS # BLD AUTO: 0.04 10*3/MM3 (ref 0–0.2)
BASOPHILS NFR BLD AUTO: 0.5 % (ref 0–1.5)
BUN SERPL-MCNC: 16 MG/DL (ref 8–23)
BUN/CREAT SERPL: 12.3 (ref 7–25)
CALCIUM SPEC-SCNC: 8.8 MG/DL (ref 8.6–10.5)
CHLORIDE SERPL-SCNC: 110 MMOL/L (ref 98–107)
CO2 SERPL-SCNC: 25 MMOL/L (ref 22–29)
CREAT SERPL-MCNC: 1.3 MG/DL (ref 0.76–1.27)
DEPRECATED RDW RBC AUTO: 49.5 FL (ref 37–54)
EOSINOPHIL # BLD AUTO: 0.23 10*3/MM3 (ref 0–0.4)
EOSINOPHIL NFR BLD AUTO: 2.6 % (ref 0.3–6.2)
ERYTHROCYTE [DISTWIDTH] IN BLOOD BY AUTOMATED COUNT: 15.9 % (ref 12.3–15.4)
GFR SERPL CREATININE-BSD FRML MDRD: 53 ML/MIN/1.73
GLUCOSE BLDC GLUCOMTR-MCNC: 135 MG/DL (ref 70–130)
GLUCOSE BLDC GLUCOMTR-MCNC: 188 MG/DL (ref 70–130)
GLUCOSE BLDC GLUCOMTR-MCNC: 211 MG/DL (ref 70–130)
GLUCOSE BLDC GLUCOMTR-MCNC: 220 MG/DL (ref 70–130)
GLUCOSE SERPL-MCNC: 226 MG/DL (ref 65–99)
GRAM STN SPEC: ABNORMAL
HCT VFR BLD AUTO: 27.5 % (ref 37.5–51)
HGB BLD-MCNC: 8.9 G/DL (ref 13–17.7)
IMM GRANULOCYTES # BLD AUTO: 0.06 10*3/MM3 (ref 0–0.05)
IMM GRANULOCYTES NFR BLD AUTO: 0.7 % (ref 0–0.5)
ISOLATED FROM: ABNORMAL
ISOLATED FROM: ABNORMAL
LYMPHOCYTES # BLD AUTO: 1.06 10*3/MM3 (ref 0.7–3.1)
LYMPHOCYTES NFR BLD AUTO: 12 % (ref 19.6–45.3)
MCH RBC QN AUTO: 28 PG (ref 26.6–33)
MCHC RBC AUTO-ENTMCNC: 32.4 G/DL (ref 31.5–35.7)
MCV RBC AUTO: 86.5 FL (ref 79–97)
MONOCYTES # BLD AUTO: 0.47 10*3/MM3 (ref 0.1–0.9)
MONOCYTES NFR BLD AUTO: 5.3 % (ref 5–12)
NEUTROPHILS NFR BLD AUTO: 6.94 10*3/MM3 (ref 1.7–7)
NEUTROPHILS NFR BLD AUTO: 78.9 % (ref 42.7–76)
NRBC BLD AUTO-RTO: 0 /100 WBC (ref 0–0.2)
PLATELET # BLD AUTO: 348 10*3/MM3 (ref 140–450)
PMV BLD AUTO: 10.6 FL (ref 6–12)
POTASSIUM SERPL-SCNC: 3.6 MMOL/L (ref 3.5–5.2)
RBC # BLD AUTO: 3.18 10*6/MM3 (ref 4.14–5.8)
SODIUM SERPL-SCNC: 146 MMOL/L (ref 136–145)
WBC # BLD AUTO: 8.8 10*3/MM3 (ref 3.4–10.8)

## 2020-10-28 PROCEDURE — 25010000002 CEFTRIAXONE PER 250 MG: Performed by: INTERNAL MEDICINE

## 2020-10-28 PROCEDURE — 63710000001 DRONABINOL PER 2.5 MG: Performed by: INTERNAL MEDICINE

## 2020-10-28 PROCEDURE — 82962 GLUCOSE BLOOD TEST: CPT

## 2020-10-28 PROCEDURE — 25010000002 NA FERRIC GLUC CPLX PER 12.5 MG: Performed by: INTERNAL MEDICINE

## 2020-10-28 PROCEDURE — 25010000002 ENOXAPARIN PER 10 MG: Performed by: INTERNAL MEDICINE

## 2020-10-28 PROCEDURE — 80048 BASIC METABOLIC PNL TOTAL CA: CPT | Performed by: INTERNAL MEDICINE

## 2020-10-28 PROCEDURE — 97116 GAIT TRAINING THERAPY: CPT

## 2020-10-28 PROCEDURE — 85025 COMPLETE CBC W/AUTO DIFF WBC: CPT | Performed by: INTERNAL MEDICINE

## 2020-10-28 PROCEDURE — 97530 THERAPEUTIC ACTIVITIES: CPT

## 2020-10-28 PROCEDURE — 97535 SELF CARE MNGMENT TRAINING: CPT

## 2020-10-28 PROCEDURE — 87040 BLOOD CULTURE FOR BACTERIA: CPT | Performed by: INTERNAL MEDICINE

## 2020-10-28 PROCEDURE — 25010000002 AZITHROMYCIN 500 MG/250 ML: Performed by: INTERNAL MEDICINE

## 2020-10-28 PROCEDURE — 63710000001 INSULIN ASPART PER 5 UNITS: Performed by: INTERNAL MEDICINE

## 2020-10-28 RX ORDER — LANOLIN ALCOHOL/MO/W.PET/CERES
6 CREAM (GRAM) TOPICAL NIGHTLY
Status: DISCONTINUED | OUTPATIENT
Start: 2020-10-28 | End: 2020-10-30 | Stop reason: HOSPADM

## 2020-10-28 RX ORDER — FERROUS SULFATE TAB EC 324 MG (65 MG FE EQUIVALENT) 324 (65 FE) MG
324 TABLET DELAYED RESPONSE ORAL
Status: DISCONTINUED | OUTPATIENT
Start: 2020-10-29 | End: 2020-10-30 | Stop reason: HOSPADM

## 2020-10-28 RX ORDER — DRONABINOL 2.5 MG/1
5 CAPSULE ORAL DAILY
Status: DISCONTINUED | OUTPATIENT
Start: 2020-10-28 | End: 2020-10-30 | Stop reason: HOSPADM

## 2020-10-28 RX ADMIN — Medication: at 09:06

## 2020-10-28 RX ADMIN — NITROGLYCERIN 1 INCH: 20 OINTMENT TOPICAL at 23:03

## 2020-10-28 RX ADMIN — SODIUM CHLORIDE 125 MG: 9 INJECTION, SOLUTION INTRAVENOUS at 14:32

## 2020-10-28 RX ADMIN — INSULIN ASPART 4 UNITS: 100 INJECTION, SOLUTION INTRAVENOUS; SUBCUTANEOUS at 09:06

## 2020-10-28 RX ADMIN — DRONABINOL 5 MG: 2.5 CAPSULE ORAL at 14:32

## 2020-10-28 RX ADMIN — Medication: at 20:01

## 2020-10-28 RX ADMIN — SODIUM CHLORIDE, PRESERVATIVE FREE 10 ML: 5 INJECTION INTRAVENOUS at 09:06

## 2020-10-28 RX ADMIN — NITROGLYCERIN 1 INCH: 20 OINTMENT TOPICAL at 11:18

## 2020-10-28 RX ADMIN — PANTOPRAZOLE SODIUM 40 MG: 40 TABLET, DELAYED RELEASE ORAL at 06:34

## 2020-10-28 RX ADMIN — SODIUM CHLORIDE, PRESERVATIVE FREE 10 ML: 5 INJECTION INTRAVENOUS at 20:01

## 2020-10-28 RX ADMIN — NYSTATIN: 100000 POWDER TOPICAL at 09:06

## 2020-10-28 RX ADMIN — ENOXAPARIN SODIUM 40 MG: 40 INJECTION SUBCUTANEOUS at 09:10

## 2020-10-28 RX ADMIN — CEFTRIAXONE SODIUM 2 G: 2 INJECTION, POWDER, FOR SOLUTION INTRAMUSCULAR; INTRAVENOUS at 09:10

## 2020-10-28 RX ADMIN — METOPROLOL TARTRATE 25 MG: 25 TABLET, FILM COATED ORAL at 09:06

## 2020-10-28 RX ADMIN — DONEPEZIL HYDROCHLORIDE 5 MG: 5 TABLET, FILM COATED ORAL at 20:01

## 2020-10-28 RX ADMIN — NYSTATIN: 100000 POWDER TOPICAL at 20:01

## 2020-10-28 RX ADMIN — NITROGLYCERIN 1 INCH: 20 OINTMENT TOPICAL at 16:49

## 2020-10-28 RX ADMIN — TAMSULOSIN HYDROCHLORIDE 0.4 MG: 0.4 CAPSULE ORAL at 09:06

## 2020-10-28 RX ADMIN — NITROGLYCERIN 1 INCH: 20 OINTMENT TOPICAL at 06:35

## 2020-10-28 RX ADMIN — METOPROLOL TARTRATE 25 MG: 25 TABLET, FILM COATED ORAL at 20:01

## 2020-10-28 RX ADMIN — NITROGLYCERIN 1 INCH: 20 OINTMENT TOPICAL at 00:38

## 2020-10-28 RX ADMIN — AZITHROMYCIN MONOHYDRATE 500 MG: 500 INJECTION, POWDER, LYOPHILIZED, FOR SOLUTION INTRAVENOUS at 06:35

## 2020-10-28 RX ADMIN — SENNOSIDES 1 TABLET: 8.6 TABLET, FILM COATED ORAL at 20:01

## 2020-10-28 RX ADMIN — INSULIN ASPART 4 UNITS: 100 INJECTION, SOLUTION INTRAVENOUS; SUBCUTANEOUS at 11:18

## 2020-10-28 RX ADMIN — MELATONIN 6 MG: 3 TAB ORAL at 21:18

## 2020-10-29 ENCOUNTER — APPOINTMENT (OUTPATIENT)
Dept: GENERAL RADIOLOGY | Facility: HOSPITAL | Age: 80
End: 2020-10-29

## 2020-10-29 LAB
GLUCOSE BLDC GLUCOMTR-MCNC: 123 MG/DL (ref 70–130)
GLUCOSE BLDC GLUCOMTR-MCNC: 216 MG/DL (ref 70–130)
GLUCOSE BLDC GLUCOMTR-MCNC: 250 MG/DL (ref 70–130)

## 2020-10-29 PROCEDURE — 97535 SELF CARE MNGMENT TRAINING: CPT

## 2020-10-29 PROCEDURE — 63710000001 INSULIN ASPART PER 5 UNITS: Performed by: INTERNAL MEDICINE

## 2020-10-29 PROCEDURE — 82962 GLUCOSE BLOOD TEST: CPT

## 2020-10-29 PROCEDURE — 97530 THERAPEUTIC ACTIVITIES: CPT

## 2020-10-29 PROCEDURE — 94799 UNLISTED PULMONARY SVC/PX: CPT

## 2020-10-29 PROCEDURE — 97116 GAIT TRAINING THERAPY: CPT

## 2020-10-29 PROCEDURE — 71046 X-RAY EXAM CHEST 2 VIEWS: CPT

## 2020-10-29 PROCEDURE — 25010000002 CEFTRIAXONE PER 250 MG: Performed by: INTERNAL MEDICINE

## 2020-10-29 PROCEDURE — 63710000001 DRONABINOL PER 2.5 MG: Performed by: INTERNAL MEDICINE

## 2020-10-29 PROCEDURE — 97110 THERAPEUTIC EXERCISES: CPT

## 2020-10-29 PROCEDURE — 25010000002 ENOXAPARIN PER 10 MG: Performed by: INTERNAL MEDICINE

## 2020-10-29 RX ORDER — LOSARTAN POTASSIUM 25 MG/1
25 TABLET ORAL
Status: DISCONTINUED | OUTPATIENT
Start: 2020-10-29 | End: 2020-10-30 | Stop reason: HOSPADM

## 2020-10-29 RX ORDER — NITROGLYCERIN 0.4 MG/1
0.4 TABLET SUBLINGUAL
Status: DISCONTINUED | OUTPATIENT
Start: 2020-10-29 | End: 2020-10-30 | Stop reason: HOSPADM

## 2020-10-29 RX ORDER — AZITHROMYCIN 250 MG/1
500 TABLET, FILM COATED ORAL
Status: DISCONTINUED | OUTPATIENT
Start: 2020-10-29 | End: 2020-10-30 | Stop reason: HOSPADM

## 2020-10-29 RX ORDER — TORSEMIDE 10 MG/1
5 TABLET ORAL DAILY
Status: DISCONTINUED | OUTPATIENT
Start: 2020-10-29 | End: 2020-10-30 | Stop reason: HOSPADM

## 2020-10-29 RX ADMIN — LOSARTAN POTASSIUM 25 MG: 25 TABLET, FILM COATED ORAL at 11:33

## 2020-10-29 RX ADMIN — INSULIN ASPART 6 UNITS: 100 INJECTION, SOLUTION INTRAVENOUS; SUBCUTANEOUS at 08:53

## 2020-10-29 RX ADMIN — NITROGLYCERIN 1 INCH: 20 OINTMENT TOPICAL at 05:10

## 2020-10-29 RX ADMIN — ENOXAPARIN SODIUM 40 MG: 40 INJECTION SUBCUTANEOUS at 08:53

## 2020-10-29 RX ADMIN — MELATONIN 6 MG: 3 TAB ORAL at 20:26

## 2020-10-29 RX ADMIN — FERROUS SULFATE TAB EC 324 MG (65 MG FE EQUIVALENT) 324 MG: 324 (65 FE) TABLET DELAYED RESPONSE at 08:52

## 2020-10-29 RX ADMIN — NITROGLYCERIN 1 INCH: 20 OINTMENT TOPICAL at 11:33

## 2020-10-29 RX ADMIN — CEFTRIAXONE SODIUM 2 G: 2 INJECTION, POWDER, FOR SOLUTION INTRAMUSCULAR; INTRAVENOUS at 08:52

## 2020-10-29 RX ADMIN — SENNOSIDES 1 TABLET: 8.6 TABLET, FILM COATED ORAL at 20:26

## 2020-10-29 RX ADMIN — AZITHROMYCIN MONOHYDRATE 500 MG: 250 TABLET ORAL at 11:33

## 2020-10-29 RX ADMIN — PANTOPRAZOLE SODIUM 40 MG: 40 TABLET, DELAYED RELEASE ORAL at 05:10

## 2020-10-29 RX ADMIN — SODIUM CHLORIDE, PRESERVATIVE FREE 10 ML: 5 INJECTION INTRAVENOUS at 08:53

## 2020-10-29 RX ADMIN — NITROGLYCERIN 1 INCH: 20 OINTMENT TOPICAL at 17:18

## 2020-10-29 RX ADMIN — NYSTATIN: 100000 POWDER TOPICAL at 08:53

## 2020-10-29 RX ADMIN — SODIUM CHLORIDE, PRESERVATIVE FREE 10 ML: 5 INJECTION INTRAVENOUS at 20:31

## 2020-10-29 RX ADMIN — DRONABINOL 5 MG: 2.5 CAPSULE ORAL at 11:33

## 2020-10-29 RX ADMIN — Medication: at 08:52

## 2020-10-29 RX ADMIN — TAMSULOSIN HYDROCHLORIDE 0.4 MG: 0.4 CAPSULE ORAL at 08:52

## 2020-10-29 RX ADMIN — METOPROLOL TARTRATE 25 MG: 25 TABLET, FILM COATED ORAL at 20:25

## 2020-10-29 RX ADMIN — TORSEMIDE 5 MG: 10 TABLET ORAL at 11:33

## 2020-10-29 RX ADMIN — METOPROLOL TARTRATE 25 MG: 25 TABLET, FILM COATED ORAL at 08:52

## 2020-10-29 RX ADMIN — INSULIN ASPART 4 UNITS: 100 INJECTION, SOLUTION INTRAVENOUS; SUBCUTANEOUS at 11:35

## 2020-10-29 RX ADMIN — Medication: at 20:30

## 2020-10-29 RX ADMIN — DONEPEZIL HYDROCHLORIDE 5 MG: 5 TABLET, FILM COATED ORAL at 20:26

## 2020-10-30 ENCOUNTER — READMISSION MANAGEMENT (OUTPATIENT)
Dept: CALL CENTER | Facility: HOSPITAL | Age: 80
End: 2020-10-30

## 2020-10-30 VITALS
BODY MASS INDEX: 24.08 KG/M2 | HEART RATE: 80 BPM | TEMPERATURE: 97.3 F | HEIGHT: 70 IN | OXYGEN SATURATION: 95 % | SYSTOLIC BLOOD PRESSURE: 143 MMHG | WEIGHT: 168.2 LBS | DIASTOLIC BLOOD PRESSURE: 63 MMHG | RESPIRATION RATE: 16 BRPM

## 2020-10-30 LAB
ANION GAP SERPL CALCULATED.3IONS-SCNC: 16 MMOL/L (ref 5–15)
BASOPHILS # BLD AUTO: 0.04 10*3/MM3 (ref 0–0.2)
BASOPHILS NFR BLD AUTO: 0.3 % (ref 0–1.5)
BUN SERPL-MCNC: 14 MG/DL (ref 8–23)
BUN/CREAT SERPL: 9.4 (ref 7–25)
CALCIUM SPEC-SCNC: 9.1 MG/DL (ref 8.6–10.5)
CHLORIDE SERPL-SCNC: 106 MMOL/L (ref 98–107)
CO2 SERPL-SCNC: 21 MMOL/L (ref 22–29)
CREAT SERPL-MCNC: 1.49 MG/DL (ref 0.76–1.27)
DEPRECATED RDW RBC AUTO: 51.3 FL (ref 37–54)
EOSINOPHIL # BLD AUTO: 0.28 10*3/MM3 (ref 0–0.4)
EOSINOPHIL NFR BLD AUTO: 2.4 % (ref 0.3–6.2)
ERYTHROCYTE [DISTWIDTH] IN BLOOD BY AUTOMATED COUNT: 16.4 % (ref 12.3–15.4)
GFR SERPL CREATININE-BSD FRML MDRD: 45 ML/MIN/1.73
GLUCOSE BLDC GLUCOMTR-MCNC: 202 MG/DL (ref 70–130)
GLUCOSE BLDC GLUCOMTR-MCNC: 208 MG/DL (ref 70–130)
GLUCOSE BLDC GLUCOMTR-MCNC: 222 MG/DL (ref 70–130)
GLUCOSE SERPL-MCNC: 205 MG/DL (ref 65–99)
HCT VFR BLD AUTO: 30.9 % (ref 37.5–51)
HGB BLD-MCNC: 9.8 G/DL (ref 13–17.7)
IMM GRANULOCYTES # BLD AUTO: 0.1 10*3/MM3 (ref 0–0.05)
IMM GRANULOCYTES NFR BLD AUTO: 0.9 % (ref 0–0.5)
LYMPHOCYTES # BLD AUTO: 1.4 10*3/MM3 (ref 0.7–3.1)
LYMPHOCYTES NFR BLD AUTO: 12.2 % (ref 19.6–45.3)
MCH RBC QN AUTO: 27.7 PG (ref 26.6–33)
MCHC RBC AUTO-ENTMCNC: 31.7 G/DL (ref 31.5–35.7)
MCV RBC AUTO: 87.3 FL (ref 79–97)
MONOCYTES # BLD AUTO: 0.48 10*3/MM3 (ref 0.1–0.9)
MONOCYTES NFR BLD AUTO: 4.2 % (ref 5–12)
NEUTROPHILS NFR BLD AUTO: 80 % (ref 42.7–76)
NEUTROPHILS NFR BLD AUTO: 9.13 10*3/MM3 (ref 1.7–7)
NRBC BLD AUTO-RTO: 0 /100 WBC (ref 0–0.2)
PLATELET # BLD AUTO: 391 10*3/MM3 (ref 140–450)
PMV BLD AUTO: 10.5 FL (ref 6–12)
POTASSIUM SERPL-SCNC: 3.5 MMOL/L (ref 3.5–5.2)
RBC # BLD AUTO: 3.54 10*6/MM3 (ref 4.14–5.8)
SODIUM SERPL-SCNC: 143 MMOL/L (ref 136–145)
WBC # BLD AUTO: 11.43 10*3/MM3 (ref 3.4–10.8)

## 2020-10-30 PROCEDURE — 63710000001 DRONABINOL PER 2.5 MG: Performed by: INTERNAL MEDICINE

## 2020-10-30 PROCEDURE — 63710000001 INSULIN ASPART PER 5 UNITS: Performed by: INTERNAL MEDICINE

## 2020-10-30 PROCEDURE — 97535 SELF CARE MNGMENT TRAINING: CPT

## 2020-10-30 PROCEDURE — 80048 BASIC METABOLIC PNL TOTAL CA: CPT | Performed by: INTERNAL MEDICINE

## 2020-10-30 PROCEDURE — 85025 COMPLETE CBC W/AUTO DIFF WBC: CPT | Performed by: INTERNAL MEDICINE

## 2020-10-30 PROCEDURE — 82962 GLUCOSE BLOOD TEST: CPT

## 2020-10-30 PROCEDURE — 97530 THERAPEUTIC ACTIVITIES: CPT

## 2020-10-30 PROCEDURE — 25010000002 ENOXAPARIN PER 10 MG: Performed by: INTERNAL MEDICINE

## 2020-10-30 PROCEDURE — 25010000002 CEFTRIAXONE PER 250 MG: Performed by: INTERNAL MEDICINE

## 2020-10-30 PROCEDURE — 97116 GAIT TRAINING THERAPY: CPT

## 2020-10-30 RX ORDER — AZITHROMYCIN 250 MG/1
250 TABLET, FILM COATED ORAL DAILY
Qty: 3 TABLET | Refills: 0 | Status: SHIPPED | OUTPATIENT
Start: 2020-10-30 | End: 2020-11-03

## 2020-10-30 RX ORDER — SENNA PLUS 8.6 MG/1
1 TABLET ORAL NIGHTLY
Qty: 30 TABLET | Refills: 1 | Status: SHIPPED | OUTPATIENT
Start: 2020-10-30

## 2020-10-30 RX ORDER — FERROUS SULFATE TAB EC 324 MG (65 MG FE EQUIVALENT) 324 (65 FE) MG
324 TABLET DELAYED RESPONSE ORAL
Qty: 30 TABLET | Refills: 1 | Status: SHIPPED | OUTPATIENT
Start: 2020-10-31

## 2020-10-30 RX ORDER — CEFDINIR 300 MG/1
300 CAPSULE ORAL DAILY
Qty: 4 CAPSULE | Refills: 0 | Status: SHIPPED | OUTPATIENT
Start: 2020-10-30 | End: 2020-11-03

## 2020-10-30 RX ORDER — DONEPEZIL HYDROCHLORIDE 5 MG/1
5 TABLET, FILM COATED ORAL NIGHTLY
Qty: 30 TABLET | Refills: 1 | Status: SHIPPED | OUTPATIENT
Start: 2020-10-30

## 2020-10-30 RX ORDER — DRONABINOL 5 MG/1
5 CAPSULE ORAL DAILY
Qty: 5 CAPSULE | Refills: 0 | Status: SHIPPED | OUTPATIENT
Start: 2020-10-30 | End: 2020-11-03 | Stop reason: SDUPTHER

## 2020-10-30 RX ORDER — LANOLIN ALCOHOL/MO/W.PET/CERES
6 CREAM (GRAM) TOPICAL NIGHTLY PRN
Qty: 30 TABLET | Refills: 0 | Status: SHIPPED | OUTPATIENT
Start: 2020-10-30

## 2020-10-30 RX ADMIN — SODIUM CHLORIDE, PRESERVATIVE FREE 10 ML: 5 INJECTION INTRAVENOUS at 08:50

## 2020-10-30 RX ADMIN — INSULIN ASPART 4 UNITS: 100 INJECTION, SOLUTION INTRAVENOUS; SUBCUTANEOUS at 08:46

## 2020-10-30 RX ADMIN — ENOXAPARIN SODIUM 40 MG: 40 INJECTION SUBCUTANEOUS at 08:46

## 2020-10-30 RX ADMIN — DRONABINOL 5 MG: 2.5 CAPSULE ORAL at 10:20

## 2020-10-30 RX ADMIN — FERROUS SULFATE TAB EC 324 MG (65 MG FE EQUIVALENT) 324 MG: 324 (65 FE) TABLET DELAYED RESPONSE at 08:46

## 2020-10-30 RX ADMIN — AZITHROMYCIN MONOHYDRATE 500 MG: 250 TABLET ORAL at 08:45

## 2020-10-30 RX ADMIN — NITROGLYCERIN 1 INCH: 20 OINTMENT TOPICAL at 06:10

## 2020-10-30 RX ADMIN — METOPROLOL TARTRATE 25 MG: 25 TABLET, FILM COATED ORAL at 08:46

## 2020-10-30 RX ADMIN — INSULIN ASPART 4 UNITS: 100 INJECTION, SOLUTION INTRAVENOUS; SUBCUTANEOUS at 11:58

## 2020-10-30 RX ADMIN — NYSTATIN: 100000 POWDER TOPICAL at 08:50

## 2020-10-30 RX ADMIN — PANTOPRAZOLE SODIUM 40 MG: 40 TABLET, DELAYED RELEASE ORAL at 06:10

## 2020-10-30 RX ADMIN — TAMSULOSIN HYDROCHLORIDE 0.4 MG: 0.4 CAPSULE ORAL at 08:46

## 2020-10-30 RX ADMIN — LOSARTAN POTASSIUM 25 MG: 25 TABLET, FILM COATED ORAL at 08:46

## 2020-10-30 RX ADMIN — Medication: at 08:50

## 2020-10-30 RX ADMIN — TORSEMIDE 5 MG: 10 TABLET ORAL at 08:45

## 2020-10-30 RX ADMIN — CEFTRIAXONE SODIUM 2 G: 2 INJECTION, POWDER, FOR SOLUTION INTRAMUSCULAR; INTRAVENOUS at 08:45

## 2020-10-30 NOTE — OUTREACH NOTE
Prep Survey      Responses   [facility]?  [facility]   Is LACE score < 7 ?  No   Eligibility  Readm Mgmt   Discharge diagnosis  hypomagnesium, PN bilat,  anemia, DM,  NSTEMI r/u   Does the patient have one of the following disease processes/diagnoses(primary or secondary)?  COPD/Pneumonia   Does the patient have Home health ordered?  Yes   What is the Home health agency?   [facility]   Is there a DME ordered?  Yes   What DME was ordered?  Bedside commode   General alerts for this patient  Pt lives in [place],  staying with daughter until able to return home   Prep survey completed?  Yes          Mimi Chávez RN

## 2020-11-02 LAB — BACTERIA SPEC AEROBE CULT: NORMAL

## 2020-11-03 ENCOUNTER — OFFICE VISIT (OUTPATIENT)
Dept: FAMILY MEDICINE CLINIC | Facility: CLINIC | Age: 80
End: 2020-11-03

## 2020-11-03 ENCOUNTER — PATIENT OUTREACH (OUTPATIENT)
Dept: FAMILY MEDICINE CLINIC | Facility: CLINIC | Age: 80
End: 2020-11-03

## 2020-11-03 VITALS
SYSTOLIC BLOOD PRESSURE: 126 MMHG | DIASTOLIC BLOOD PRESSURE: 82 MMHG | BODY MASS INDEX: 25.16 KG/M2 | WEIGHT: 175.38 LBS | HEART RATE: 86 BPM | TEMPERATURE: 96.2 F | OXYGEN SATURATION: 97 %

## 2020-11-03 DIAGNOSIS — J18.9 PNEUMONIA OF LEFT LOWER LOBE DUE TO INFECTIOUS ORGANISM: ICD-10-CM

## 2020-11-03 DIAGNOSIS — Z09 HOSPITAL DISCHARGE FOLLOW-UP: ICD-10-CM

## 2020-11-03 DIAGNOSIS — Z74.09 IMPAIRED FUNCTIONAL MOBILITY, BALANCE, GAIT, AND ENDURANCE: ICD-10-CM

## 2020-11-03 DIAGNOSIS — Z09 HOSPITAL DISCHARGE FOLLOW-UP: Primary | ICD-10-CM

## 2020-11-03 PROCEDURE — 99496 TRANSJ CARE MGMT HIGH F2F 7D: CPT | Performed by: STUDENT IN AN ORGANIZED HEALTH CARE EDUCATION/TRAINING PROGRAM

## 2020-11-03 RX ORDER — CODEINE PHOSPHATE AND GUAIFENESIN 10; 100 MG/5ML; MG/5ML
5 SOLUTION ORAL 3 TIMES DAILY PRN
Qty: 180 ML | Refills: 3 | Status: SHIPPED | OUTPATIENT
Start: 2020-11-03 | End: 2020-11-03 | Stop reason: SDUPTHER

## 2020-11-03 RX ORDER — METOPROLOL SUCCINATE 25 MG/1
TABLET, EXTENDED RELEASE ORAL
COMMUNITY
Start: 2020-10-02

## 2020-11-03 RX ORDER — DRONABINOL 5 MG/1
5 CAPSULE ORAL DAILY
Qty: 30 CAPSULE | Refills: 0 | Status: SHIPPED | OUTPATIENT
Start: 2020-11-03 | End: 2020-11-03 | Stop reason: SDUPTHER

## 2020-11-03 RX ORDER — DRONABINOL 5 MG/1
5 CAPSULE ORAL DAILY
Qty: 30 CAPSULE | Refills: 0 | Status: SHIPPED | OUTPATIENT
Start: 2020-11-03 | End: 2020-11-08

## 2020-11-03 RX ORDER — CODEINE PHOSPHATE AND GUAIFENESIN 10; 100 MG/5ML; MG/5ML
5 SOLUTION ORAL 3 TIMES DAILY PRN
Qty: 180 ML | Refills: 3 | Status: CANCELLED | OUTPATIENT
Start: 2020-11-03

## 2020-11-03 RX ORDER — CODEINE PHOSPHATE AND GUAIFENESIN 10; 100 MG/5ML; MG/5ML
5 SOLUTION ORAL 3 TIMES DAILY PRN
Qty: 180 ML | Refills: 3 | Status: SHIPPED | OUTPATIENT
Start: 2020-11-03

## 2020-11-03 RX ORDER — DRONABINOL 5 MG/1
5 CAPSULE ORAL DAILY
Qty: 30 CAPSULE | Refills: 0 | Status: CANCELLED | OUTPATIENT
Start: 2020-11-03 | End: 2020-11-08

## 2020-11-03 NOTE — PROGRESS NOTES
Subjective:     Kaleb Francois is a 80 y.o. male who presents for a hospital follow up for pneumonia.    Hospital Follow Up:   The patient was admitted at [facility] from 10- through 10- for pneumonia. Patient did not have any difficulty filling all of the prescriptions from the discharge. Since his discharge, he seems to be improving.  Patient reports breathing well improved.  Patient with chronic dementia.  Patient is up from [place] visiting family when he got ill.  Patient requesting refill of his medication for appetite and cough suppression, in addition to sore from wheelchair bound and physical deconditioning, also requesting home health physical therapy to address here in [place] prior to moving back to [place].    Past Medical Hx:  Past Medical History:   Diagnosis Date   • Aortic stenosis    • CAD (coronary artery disease)    • CKD (chronic kidney disease) stage 3, GFR 30-59 ml/min    • Dementia (CMS/[facility])    • Diabetes mellitus (CMS/[facility])    • Hypercholesteremia    • Hypertension    • LVH (left ventricular hypertrophy)    • Osteoarthritis        Past Surgical Hx:  Past Surgical History:   Procedure Laterality Date   • CORONARY ARTERY BYPASS GRAFT         Health Maintenance:  Health Maintenance   Topic Date Due   • URINE MICROALBUMIN  1940   • TDAP/TD VACCINES (1 - Tdap) 05/05/1959   • ZOSTER VACCINE (1 of 2) 05/05/1990   • ANNUAL WELLNESS VISIT  10/30/2020   • LIPID PANEL  10/30/2020   • HEMOGLOBIN A1C  10/30/2020   • DIABETIC EYE EXAM  10/30/2020   • Pneumococcal Vaccine 65+ (1 of 1 - PPSV23) 11/03/2020 (Originally 5/5/2005)   • INFLUENZA VACCINE  12/24/2020 (Originally 8/1/2020)       Current Meds:    Current Outpatient Medications:   •  APPLE CIDER VINEGAR PO, Take 1 tablet by mouth., Disp: , Rfl:   •  Aspirin Buf,CaCarb-MgCarb-MgO, 81 MG tablet, Take 81 mg by mouth., Disp: , Rfl:   •  Cholecalciferol (Vitamin D3) 25 MCG (1000 UT) capsule, Take 1  capsule by mouth., Disp: , Rfl:   •  COD LIVER OIL PO, Take 1 tablet by mouth., Disp: , Rfl:   •  donepezil (ARICEPT) 5 MG tablet, Take 1 tablet by mouth Every Night., Disp: 30 tablet, Rfl: 1  •  ferrous sulfate 324 (65 Fe) MG tablet delayed-release EC tablet, Take 1 tablet by mouth Daily With Breakfast., Disp: 30 tablet, Rfl: 1  •  fluticasone (FLOVENT HFA) 220 MCG/ACT inhaler, Inhale 1 puff., Disp: , Rfl:   •  levalbuterol (XOPENEX HFA) 45 MCG/ACT inhaler, Inhale 1-2 puffs Every 6 (Six) Hours As Needed., Disp: , Rfl:   •  losartan (COZAAR) 25 MG tablet, Take 25 mg by mouth., Disp: , Rfl:   •  melatonin 3 MG tablet, Take 2 tablets by mouth At Night As Needed for Sleep. (Patient taking differently: Take 1.5 mg by mouth At Night As Needed for Sleep.), Disp: 30 tablet, Rfl: 0  •  metFORMIN (GLUCOPHAGE) 500 MG tablet, Take 500 mg by mouth 2 (Two) Times a Day With Meals., Disp: , Rfl:   •  metoprolol succinate XL (TOPROL-XL) 25 MG 24 hr tablet, , Disp: , Rfl:   •  Multiple Vitamin (multivitamin) capsule, Take 1 capsule by mouth., Disp: , Rfl:   •  nitroglycerin (NITROSTAT) 0.4 MG SL tablet, Place 0.4 mg under the tongue., Disp: , Rfl:   •  pantoprazole (PROTONIX) 40 MG EC tablet, Take 40 mg by mouth., Disp: , Rfl:   •  senna (SENOKOT) 8.6 MG tablet, Take 1 tablet by mouth Every Night., Disp: 30 tablet, Rfl: 1  •  simvastatin (ZOCOR) 20 MG tablet, Take 20 mg by mouth., Disp: , Rfl:   •  tamsulosin (FLOMAX) 0.4 MG capsule 24 hr capsule, Take 0.4 mg by mouth., Disp: , Rfl:   •  thiamine (VITAMIN B-1) 100 MG tablet, Take 100 mg by mouth Daily., Disp: , Rfl:   •  torsemide (DEMADEX) 10 MG tablet, Take 5 mg by mouth., Disp: , Rfl:   •  Bacitracin Zinc (magic butt ointment), Apply 1 each topically to the appropriate area as directed As Needed (Apply to area 3 times daily as needed.)., Disp: 120 g, Rfl: 3  •  dronabinol (MARINOL) 5 MG capsule, Take 1 capsule by mouth Daily for 5 doses., Disp: 30 capsule, Rfl: 0  •   guaiFENesin-codeine (ROMILAR-AC) 100-10 MG/5ML syrup, Take 5 mL by mouth 3 (Three) Times a Day As Needed for Cough., Disp: 180 mL, Rfl: 3  •  hydrocortisone-bacitracin-zinc oxide-nystatin (MAGIC BARRIER), Apply 1 application topically to the appropriate area as directed As Needed (wound and irritation)., Disp: 120 g, Rfl: 1  Current outpatient and discharge medications have been reconciled for the patient.  Reviewed by: Wily Kline III, MD    Allergies:  Patient has no known allergies.    Family Hx:  Family History   Problem Relation Age of Onset   • Hypertension Father         Social History:  Social History     Socioeconomic History   • Marital status:      Spouse name: Not on file   • Number of children: Not on file   • Years of education: Not on file   • Highest education level: Not on file   Tobacco Use   • Smoking status: Former Smoker     Types: Pipe   Substance and Sexual Activity   • Alcohol use: Never     Frequency: Never   • Drug use: Never   • Sexual activity: Not Currently       Review of Systems  Review of Systems   Constitutional: Positive for appetite change. Negative for activity change, chills, diaphoresis and fever.   HENT: Negative for congestion, rhinorrhea, sinus pressure, sinus pain and sore throat.    Eyes: Negative for visual disturbance.   Respiratory: Negative for apnea, cough, choking, chest tightness, shortness of breath and wheezing.    Cardiovascular: Negative for chest pain, palpitations and leg swelling.   Gastrointestinal: Negative for abdominal distention, abdominal pain, blood in stool, constipation, diarrhea, nausea and vomiting.   Genitourinary: Negative for difficulty urinating, dysuria, frequency, hematuria and urgency.   Musculoskeletal: Negative for arthralgias, back pain, joint swelling, myalgias and neck pain.   Skin: Negative for color change, pallor, rash and wound.   Neurological: Positive for weakness. Negative for dizziness, numbness and headaches.      Psychiatric/Behavioral: Negative for agitation and behavioral problems.       Objective:     /82   Pulse 86   Temp 96.2 °F (35.7 °C) (Temporal)   Wt 79.5 kg (175 lb 6 oz)   SpO2 97%   BMI 25.16 kg/m²   Physical Exam  Constitutional:       General: He is not in acute distress.     Appearance: He is well-developed. He is not diaphoretic.   HENT:      Head: Normocephalic and atraumatic.      Right Ear: External ear normal.      Left Ear: External ear normal.      Nose: Nose normal.   Eyes:      General: No scleral icterus.        Right eye: No discharge.         Left eye: No discharge.      Conjunctiva/sclera: Conjunctivae normal.      Pupils: Pupils are equal, round, and reactive to light.   Neck:      Musculoskeletal: Normal range of motion and neck supple.      Thyroid: No thyromegaly.   Cardiovascular:      Rate and Rhythm: Normal rate and regular rhythm.      Heart sounds: Normal heart sounds. No murmur. No friction rub. No gallop.    Pulmonary:      Effort: Pulmonary effort is normal. No respiratory distress.      Breath sounds: Normal breath sounds. No wheezing or rales.   Chest:      Chest wall: No tenderness.   Abdominal:      General: Bowel sounds are normal. There is no distension.      Palpations: Abdomen is soft. There is no mass.      Tenderness: There is no abdominal tenderness. There is no guarding.   Musculoskeletal: Normal range of motion.         General: No tenderness or deformity.   Lymphadenopathy:      Cervical: No cervical adenopathy.   Skin:     General: Skin is warm and dry.      Capillary Refill: Capillary refill takes 2 to 3 seconds.   Neurological:      Mental Status: He is alert and oriented to person, place, and time.      Cranial Nerves: No cranial nerve deficit.   Psychiatric:         Behavior: Behavior normal.         Thought Content: Thought content normal.         Judgment: Judgment normal.       Assessment/Plan:         Diagnoses and all orders for this visit:    1.  Hospital discharge follow-up (Primary)  -     Discontinue: guaiFENesin-codeine (ROMILAR-AC) 100-10 MG/5ML syrup; Take 5 mL by mouth 3 (Three) Times a Day As Needed for Cough.  Dispense: 180 mL; Refill: 3    2. Impaired functional mobility, balance, gait, and endurance  -     Discontinue: dronabinol (MARINOL) 5 MG capsule; Take 1 capsule by mouth Daily for 5 doses.  Dispense: 30 capsule; Refill: 0  -     Ambulatory Referral to Home Health  -     Bacitracin Zinc (magic butt ointment); Apply 1 each topically to the appropriate area as directed As Needed (Apply to area 3 times daily as needed.).  Dispense: 120 g; Refill: 3    3. Pneumonia of left lower lobe due to infectious organism  -     Discontinue: guaiFENesin-codeine (ROMILAR-AC) 100-10 MG/5ML syrup; Take 5 mL by mouth 3 (Three) Times a Day As Needed for Cough.  Dispense: 180 mL; Refill: 3    Other orders  -     Discontinue: Bacitracin Zinc (magic butt ointment); Apply 1 each topically to the appropriate area as directed As Needed (Apply to area 3 times daily as needed.).  Dispense: 120 g; Refill: 3  -     Discontinue: hydrocortisone-bacitracin-zinc oxide-nystatin (MAGIC BARRIER); Apply 1 application topically to the appropriate area as directed As Needed (wound and irritation).  Dispense: 120 g; Refill: 1    1.  Refill of cough suppression and appetite stimulant which is improved.  Patient with beginnings of irritation to buttock requesting Butt ointment which will be telephone called into local pharmacy here in [place].  Patient also needing home health for impaired mobility as per item #2 as he classically walks with a walker but concerned about transition back to [place] and ambulation there with concerns about transportation causing further weakness.  Patient has completed antibiotic treatment without any issues.  Patient will follow up with his health maintenance issues down in [place] when he returns in the next month or so.  Follow-up:     Return  in about 1 month (around 12/3/2020) for Recheck weakness and appetite.      Goals    None       ESTEFANI: [id number] reviewed and appropriate  Preventative:    Vaccines Recommended at this visit:   Influenza, Prevnar 13, Pneumovax 23, Shingrix and TDaP/TD    Vaccines Received at this visit:  Patient refused all today.     Screenings Recommended at this visit:  No Screenings offered today. Patient is up to date on all screenings at this time.     Screenings Ordered at this visit:  No screenings were offered today. Patient is up to date on all screenings.     Smoking Status:  Patient is a former smoker.    Alcohol Intake:  Patient does not drink    Patient's Body mass index is 25.16 kg/m². BMI is within normal parameters. No follow-up required..         RISK SCORE: 3         This document has been electronically signed by Wily Kline III, MD on November 3, 2020 18:50 CST      Dragon disclaimer: Parts of this note were transcribed using dragon dictation software.

## 2020-11-04 ENCOUNTER — OUTSIDE FACILITY SERVICE (OUTPATIENT)
Dept: FAMILY MEDICINE CLINIC | Facility: CLINIC | Age: 80
End: 2020-11-04

## 2020-11-04 ENCOUNTER — READMISSION MANAGEMENT (OUTPATIENT)
Dept: CALL CENTER | Facility: HOSPITAL | Age: 80
End: 2020-11-04

## 2020-11-04 PROCEDURE — G0180 MD CERTIFICATION HHA PATIENT: HCPCS | Performed by: FAMILY MEDICINE

## 2020-11-04 NOTE — PROGRESS NOTES
I have reviewed the notes, assessments, and/or procedures performed by Dr. Wily Kline, I concur with his  documentation and assessment and plan for Kaleb Robert Alessandro   With the patient was discharged from [facility] Home health was ordered.  It was put on hold until follow-up.  [facility] call today to okay continuation of the orders which would include PT OT and nursing care.  I did approve those in Dr. Kline's absence.        This document has been electronically signed by Irving Allen MD on November 4, 2020 11:09 CST

## 2020-11-04 NOTE — OUTREACH NOTE
COPD/PN Week 1 Survey      Responses   [facility] patient discharged from?  [place]   Does the patient have one of the following disease processes/diagnoses(primary or secondary)?  COPD/Pneumonia   Was the primary reason for admission:  Pneumonia   Week 1 attempt successful?  Yes   Call start time  [phone]   Call end time  [phone]   General alerts for this patient  Pt lives in [place],  staying with daughter until able to return home   Discharge diagnosis  hypomagnesium, PN bilat,  anemia, DM,  NSTEMI r/u   List who call center can speak with  wife   Meds reviewed with patient/caregiver?  Yes   Is the patient having any side effects they believe may be caused by any medication additions or changes?  No   Does the patient have all medications ordered at discharge?  Yes   Is the patient taking all medications as directed (includes completed medication regime)?  Yes   Does the patient have a primary care provider?   Yes   Does the patient have an appointment with their PCP or specialist within 7 days of discharge?  Yes   Has the patient kept scheduled appointments due by today?  Yes   What is the Home health agency?   [facility]   Pulse Ox monitoring  None   Psychosocial issues?  No   If the patient is a current smoker, are they able to teach back resources for cessation?  Not a smoker   Is the patient/caregiver able to teach back the hierarchy of who to call/visit for symptoms/problems? PCP, Specialist, Home health nurse, Urgent Care, ED, 911  Yes   Is the patient/caregiver able to teach back signs and symptoms of worsening condition:  Chest pain, Shortness of breath, Fever/chills   Is the patient/caregiver able to teach back importance of completing antibiotic course of treatment?  Yes   Week 1 call completed?  Yes          Desiree Whitmore RN

## 2020-11-05 ENCOUNTER — TELEPHONE (OUTPATIENT)
Dept: FAMILY MEDICINE CLINIC | Facility: CLINIC | Age: 80
End: 2020-11-05

## 2020-11-05 DIAGNOSIS — E11.69 TYPE 2 DIABETES MELLITUS WITH OTHER SPECIFIED COMPLICATION, WITHOUT LONG-TERM CURRENT USE OF INSULIN (HCC): Primary | Chronic | ICD-10-CM

## 2020-11-05 RX ORDER — TORSEMIDE 10 MG/1
10 TABLET ORAL
Status: CANCELLED | OUTPATIENT
Start: 2020-11-05

## 2020-11-05 NOTE — TELEPHONE ENCOUNTER
PATIENT IS REQUESTING AN APT/ REFERRAL FOR PODIATRY.  CALL BACK NUMBER FOR PATIENT [phone].    THANKS,  DEL

## 2020-11-11 ENCOUNTER — TELEPHONE (OUTPATIENT)
Dept: FAMILY MEDICINE CLINIC | Facility: CLINIC | Age: 80
End: 2020-11-11

## 2020-11-11 NOTE — TELEPHONE ENCOUNTER
Called and talk to wife and patient we schedule patient on abdomen day for Dr. Erazo at 1030 hrs. on 11- after his podiatry appointment downstairs.  This will include 6-minute walk test and qualify for home oxygen.        This document has been electronically signed by Wily Kline III, MD on November 11, 2020 13:52 CST      Dragon disclaimer: Parts of this note were transcribed using dragon dictation software.

## 2020-11-11 NOTE — TELEPHONE ENCOUNTER
SELINA FROM HOME HEALTH CALLED REGARDING PATIENT. PATIENTS O2 HAS BEEN RUNNING IN THE MID EIGHTIES CONSISTENTLY, WITH THE OCCASIONAL 90S WHEN HE FOCUSES ON IT.  SHE WOULD LIKE TO KNOW IF PCP WOULD BE INTERESTED IN DOING A TEST TO SEE IF PATIENT COULD GET O2 AT HOME.    CALL BACK NUMBER FOR HER [phone].    THANKS,  DEL

## 2020-11-12 ENCOUNTER — READMISSION MANAGEMENT (OUTPATIENT)
Dept: CALL CENTER | Facility: HOSPITAL | Age: 80
End: 2020-11-12

## 2020-11-12 ENCOUNTER — OFFICE VISIT (OUTPATIENT)
Dept: PODIATRY | Facility: CLINIC | Age: 80
End: 2020-11-12

## 2020-11-12 ENCOUNTER — OFFICE VISIT (OUTPATIENT)
Dept: FAMILY MEDICINE CLINIC | Facility: CLINIC | Age: 80
End: 2020-11-12

## 2020-11-12 VITALS
BODY MASS INDEX: 25.05 KG/M2 | SYSTOLIC BLOOD PRESSURE: 120 MMHG | DIASTOLIC BLOOD PRESSURE: 70 MMHG | OXYGEN SATURATION: 85 % | HEART RATE: 64 BPM | WEIGHT: 175 LBS | HEIGHT: 70 IN

## 2020-11-12 VITALS — HEART RATE: 88 BPM | HEIGHT: 70 IN | BODY MASS INDEX: 25.05 KG/M2 | OXYGEN SATURATION: 92 % | WEIGHT: 175 LBS

## 2020-11-12 DIAGNOSIS — J44.1 COPD WITH EXACERBATION (HCC): Primary | ICD-10-CM

## 2020-11-12 DIAGNOSIS — E11.9 ENCOUNTER FOR DIABETIC FOOT EXAM (HCC): Primary | ICD-10-CM

## 2020-11-12 DIAGNOSIS — F03.90 DEMENTIA WITHOUT BEHAVIORAL DISTURBANCE, UNSPECIFIED DEMENTIA TYPE: Chronic | ICD-10-CM

## 2020-11-12 DIAGNOSIS — R06.02 SHORTNESS OF BREATH: Chronic | ICD-10-CM

## 2020-11-12 DIAGNOSIS — N18.30 STAGE 3 CHRONIC KIDNEY DISEASE, UNSPECIFIED WHETHER STAGE 3A OR 3B CKD (HCC): Chronic | ICD-10-CM

## 2020-11-12 DIAGNOSIS — M20.41 HAMMER TOES OF BOTH FEET: ICD-10-CM

## 2020-11-12 DIAGNOSIS — R09.02 HYPOXIA: ICD-10-CM

## 2020-11-12 DIAGNOSIS — E11.42 DIABETIC POLYNEUROPATHY ASSOCIATED WITH TYPE 2 DIABETES MELLITUS (HCC): ICD-10-CM

## 2020-11-12 DIAGNOSIS — M20.42 HAMMER TOES OF BOTH FEET: ICD-10-CM

## 2020-11-12 DIAGNOSIS — M79.675 PAIN IN TOES OF BOTH FEET: ICD-10-CM

## 2020-11-12 DIAGNOSIS — M20.12 VALGUS DEFORMITY OF BOTH GREAT TOES: ICD-10-CM

## 2020-11-12 DIAGNOSIS — M20.11 VALGUS DEFORMITY OF BOTH GREAT TOES: ICD-10-CM

## 2020-11-12 DIAGNOSIS — B35.1 ONYCHOMYCOSIS: ICD-10-CM

## 2020-11-12 DIAGNOSIS — M79.674 PAIN IN TOES OF BOTH FEET: ICD-10-CM

## 2020-11-12 DIAGNOSIS — I87.2 VENOUS INSUFFICIENCY: ICD-10-CM

## 2020-11-12 DIAGNOSIS — E11.69 TYPE 2 DIABETES MELLITUS WITH OTHER SPECIFIED COMPLICATION, WITHOUT LONG-TERM CURRENT USE OF INSULIN (HCC): Chronic | ICD-10-CM

## 2020-11-12 DIAGNOSIS — I35.0 AORTIC VALVE STENOSIS, ETIOLOGY OF CARDIAC VALVE DISEASE UNSPECIFIED: Chronic | ICD-10-CM

## 2020-11-12 PROCEDURE — 99214 OFFICE O/P EST MOD 30 MIN: CPT | Performed by: FAMILY MEDICINE

## 2020-11-12 PROCEDURE — 11721 DEBRIDE NAIL 6 OR MORE: CPT | Performed by: PODIATRIST

## 2020-11-12 PROCEDURE — 99203 OFFICE O/P NEW LOW 30 MIN: CPT | Performed by: PODIATRIST

## 2020-11-12 NOTE — PROGRESS NOTES
Kaleb Francois  1940  80 y.o. male   PCP: Wily Burnett 11/3/2020       Patient came to clinic with wife for diabetic foot care     11/12/2020  Chief Complaint   Patient presents with   • Left Foot - diabetic foot care   • Right Foot - diabetic foot care           History of Present Illness    Kaleb Francois is a 80 y.o. male who presents for diabetic foot exam and nail care.  He is an insulin-dependent diabetic.  He is uncertain of his most recent hemoglobin A1c.  He does note some chronic numbness to his feet but denies significant tingling or burning.  He does have chronic digital deformities and thickened irregular and you elongated nails which cause achy pain close toed shoe gear.  Patient was recently admitted here at [facility] for treatment of pneumonia.  He typically resides out of [facility].  He denies any history of diabetic foot ulceration or infections.  He denies any other acute pedal complaints.            Past Medical History:   Diagnosis Date   • Aortic stenosis    • CAD (coronary artery disease)    • CKD (chronic kidney disease) stage 3, GFR 30-59 ml/min    • Dementia (CMS/HCC)    • Diabetes (CMS/[facility])    • Diabetes mellitus (CMS/[facility])    • Heart problem    • Hypercholesteremia    • Hypertension    • LVH (left ventricular hypertrophy)    • Osteoarthritis          Past Surgical History:   Procedure Laterality Date   • CARDIAC SURGERY     • CORONARY ARTERY BYPASS GRAFT           Family History   Problem Relation Age of Onset   • Hypertension Father          Social History     Socioeconomic History   • Marital status:      Spouse name: Not on file   • Number of children: Not on file   • Years of education: Not on file   • Highest education level: Not on file   Tobacco Use   • Smoking status: Former Smoker     Types: Pipe   Substance and Sexual Activity   • Alcohol use: Never     Frequency: Never   • Drug use: Never   • Sexual activity: Not Currently                  Current Outpatient Medications   Medication Sig Dispense Refill   • APPLE CIDER VINEGAR PO Take 1 tablet by mouth.     • Aspirin Buf,CaCarb-MgCarb-MgO, 81 MG tablet Take 81 mg by mouth.     • Bacitracin Zinc (magic butt ointment) Apply 1 each topically to the appropriate area as directed As Needed (Apply to area 3 times daily as needed.). 120 g 3   • Cholecalciferol (Vitamin D3) 25 MCG (1000 UT) capsule Take 1 capsule by mouth.     • COD LIVER OIL PO Take 1 tablet by mouth.     • donepezil (ARICEPT) 5 MG tablet Take 1 tablet by mouth Every Night. 30 tablet 1   • ferrous sulfate 324 (65 Fe) MG tablet delayed-release EC tablet Take 1 tablet by mouth Daily With Breakfast. 30 tablet 1   • fluticasone (FLOVENT HFA) 220 MCG/ACT inhaler Inhale 1 puff.     • guaiFENesin-codeine (ROMILAR-AC) 100-10 MG/5ML syrup Take 5 mL by mouth 3 (Three) Times a Day As Needed for Cough. 180 mL 3   • hydrocortisone-bacitracin-zinc oxide-nystatin (MAGIC BARRIER) Apply 1 application topically to the appropriate area as directed As Needed (wound and irritation). 120 g 1   • levalbuterol (XOPENEX HFA) 45 MCG/ACT inhaler Inhale 1-2 puffs Every 6 (Six) Hours As Needed.     • losartan (COZAAR) 25 MG tablet Take 25 mg by mouth.     • melatonin 3 MG tablet Take 2 tablets by mouth At Night As Needed for Sleep. (Patient taking differently: Take 1.5 mg by mouth At Night As Needed for Sleep.) 30 tablet 0   • metFORMIN (GLUCOPHAGE) 500 MG tablet Take 500 mg by mouth 2 (Two) Times a Day With Meals.     • metoprolol succinate XL (TOPROL-XL) 25 MG 24 hr tablet      • Multiple Vitamin (multivitamin) capsule Take 1 capsule by mouth.     • nitroglycerin (NITROSTAT) 0.4 MG SL tablet Place 0.4 mg under the tongue.     • pantoprazole (PROTONIX) 40 MG EC tablet Take 40 mg by mouth.     • senna (SENOKOT) 8.6 MG tablet Take 1 tablet by mouth Every Night. 30 tablet 1   • simvastatin (ZOCOR) 20 MG tablet Take 20 mg by mouth.     • tamsulosin (FLOMAX) 0.4 MG  "capsule 24 hr capsule Take 0.4 mg by mouth.     • thiamine (VITAMIN B-1) 100 MG tablet Take 100 mg by mouth Daily.     • torsemide (DEMADEX) 10 MG tablet Take 10 mg by mouth.       No current facility-administered medications for this visit.          OBJECTIVE    Pulse 88   Ht 177.8 cm (70\")   Wt 79.4 kg (175 lb)   SpO2 92%   BMI 25.11 kg/m²       Review of Systems   Constitutional: Negative.    HENT: Negative.    Eyes: Negative.    Respiratory: Negative.    Cardiovascular: Positive for chest pain and leg swelling.   Gastrointestinal: Negative.    Endocrine: Negative.    Genitourinary: Positive for urgency.   Musculoskeletal: Negative.    Skin: Positive for rash.   Allergic/Immunologic: Negative.    Neurological: Negative.    Hematological: Negative.    Psychiatric/Behavioral: Positive for confusion and dysphoric mood.         Diabetic Foot Exam Performed and Monofilament Test Performed       Constitutional: he appears well-developed and well-nourished.   HEENT: Normocephalic. Atraumatic  CV: No tenderness. RRR  Resp: Non-labored respiration. No wheezes.   Psychiatric: he has a normal mood and affect. his   behavior is normal.      Lower Extremity Exam:  Vascular: DP/PT pulses palpable 1+.   Negative hair growth.   1+ perimalleolar edema  Neuro: Protective sensation diminished to lesser toes, plantar foot, b/l.  DTRs intact  Integument: No open wounds or lesions.  Atrophic skin noted b/l with chronic venous stasis dermatitis  No masses  Webspaces c/d/i  Musculoskeletal: LE muscle strength 4/5.   Gait assisted with wheelchair  Semirigid hammertoe deformity toes 2 through 5.  Mild to moderate hallux abductovalgus bilateral  Nails 1-5 b/l thickened, elongated with subungual debris. +pain on palpation  Ankle ROM decreased without pain or crepitus, b/l              ASSESSMENT AND PLAN    Diagnoses and all orders for this visit:    1. Encounter for diabetic foot exam (CMS/[facility]) (Primary)    2. Diabetic polyneuropathy " associated with type 2 diabetes mellitus (CMS/HCC)    3. Onychomycosis    4. Hammer toes of both feet    5. Valgus deformity of both great toes    6. Pain in toes of both feet    7. Venous insufficiency      -Comprehensive DM foot exam performed. Patient educated on importance of tight glucose control and daily foot checks.   -Patient educated on padding techniques for hammertoes.  Proper extra depth diabetic shoe gear.  Limit barefoot walking.  -Nails 1-5 b/l debrided in length and thickness with nail nipper to decrease pain, fungal load and risk of infection  -Follow up 3 months PRN          This document has been electronically signed by Norbert Nguyen DPM on November 12, 2020 12:42 CST     EMR Dragon/Transcription disclaimer:   Much of this encounter note is an electronic transcription/translation of spoken language to printed text. The electronic translation of spoken language may permit erroneous, or at times, nonsensical words or phrases to be inadvertently transcribed; Although I have reviewed the note for such errors, some may still exist.    Norbert Nguyen DPM  11/12/2020  12:42 CST

## 2020-11-12 NOTE — PROGRESS NOTES
Kaleb Francois  11/12/2020    Subjective:     Kaleb Francois is a 80 y.o. male who presents for   Chief Complaint   Patient presents with   • Shortness of Breath     O2 cert              80-year-old male with history of COPD diabetes recent admission for pneumonia presents on referral from home health for home oxygen certification.  Patient reports his O2 sats at home have consistently been low 90% on room air and he gets short of breath with even modest exertion.  He and his wife are moving back home to [place] (they were visiting to look after their grandchildren for the past several weeks but are planning to move back to [place]) and home health have recommended that he have home oxygen therapy prior to leaving [place].  Patient states even modest exertion leaves him gasping for air.  He also has intermittent chest pain relieved with Nitrostat and has impaired cognition when he has hypoxic.            Past Medical Hx:  Past Medical History:   Diagnosis Date   • Aortic stenosis    • CAD (coronary artery disease)    • CKD (chronic kidney disease) stage 3, GFR 30-59 ml/min    • Dementia (CMS/[facility])    • Diabetes (CMS/[facility])    • Diabetes mellitus (CMS/[facility])    • Heart problem    • Hypercholesteremia    • Hypertension    • LVH (left ventricular hypertrophy)    • Osteoarthritis        Past Surgical Hx:  Past Surgical History:   Procedure Laterality Date   • CARDIAC SURGERY     • CORONARY ARTERY BYPASS GRAFT         Health Maintenance:  Health Maintenance   Topic Date Due   • URINE MICROALBUMIN  1940   • TDAP/TD VACCINES (1 - Tdap) 05/05/1959   • ZOSTER VACCINE (1 of 2) 05/05/1990   • Pneumococcal Vaccine 65+ (1 of 1 - PPSV23) 05/05/2005   • ANNUAL WELLNESS VISIT  10/30/2020   • LIPID PANEL  10/30/2020   • HEMOGLOBIN A1C  10/30/2020   • DIABETIC EYE EXAM  10/30/2020   • INFLUENZA VACCINE  12/24/2020 (Originally 8/1/2020)       Current Meds:    Current Outpatient Medications:   •  APPLE CIDER  VINEGAR PO, Take 1 tablet by mouth., Disp: , Rfl:   •  Aspirin Buf,CaCarb-MgCarb-MgO, 81 MG tablet, Take 81 mg by mouth., Disp: , Rfl:   •  Bacitracin Zinc (magic butt ointment), Apply 1 each topically to the appropriate area as directed As Needed (Apply to area 3 times daily as needed.)., Disp: 120 g, Rfl: 3  •  Cholecalciferol (Vitamin D3) 25 MCG (1000 UT) capsule, Take 1 capsule by mouth., Disp: , Rfl:   •  COD LIVER OIL PO, Take 1 tablet by mouth., Disp: , Rfl:   •  donepezil (ARICEPT) 5 MG tablet, Take 1 tablet by mouth Every Night., Disp: 30 tablet, Rfl: 1  •  ferrous sulfate 324 (65 Fe) MG tablet delayed-release EC tablet, Take 1 tablet by mouth Daily With Breakfast., Disp: 30 tablet, Rfl: 1  •  fluticasone (FLOVENT HFA) 220 MCG/ACT inhaler, Inhale 1 puff., Disp: , Rfl:   •  guaiFENesin-codeine (ROMILAR-AC) 100-10 MG/5ML syrup, Take 5 mL by mouth 3 (Three) Times a Day As Needed for Cough., Disp: 180 mL, Rfl: 3  •  hydrocortisone-bacitracin-zinc oxide-nystatin (MAGIC BARRIER), Apply 1 application topically to the appropriate area as directed As Needed (wound and irritation)., Disp: 120 g, Rfl: 1  •  levalbuterol (XOPENEX HFA) 45 MCG/ACT inhaler, Inhale 1-2 puffs Every 6 (Six) Hours As Needed., Disp: , Rfl:   •  losartan (COZAAR) 25 MG tablet, Take 25 mg by mouth., Disp: , Rfl:   •  melatonin 3 MG tablet, Take 2 tablets by mouth At Night As Needed for Sleep. (Patient taking differently: Take 1.5 mg by mouth At Night As Needed for Sleep.), Disp: 30 tablet, Rfl: 0  •  metFORMIN (GLUCOPHAGE) 500 MG tablet, Take 500 mg by mouth 2 (Two) Times a Day With Meals., Disp: , Rfl:   •  metoprolol succinate XL (TOPROL-XL) 25 MG 24 hr tablet, , Disp: , Rfl:   •  Multiple Vitamin (multivitamin) capsule, Take 1 capsule by mouth., Disp: , Rfl:   •  nitroglycerin (NITROSTAT) 0.4 MG SL tablet, Place 0.4 mg under the tongue., Disp: , Rfl:   •  pantoprazole (PROTONIX) 40 MG EC tablet, Take 40 mg by mouth., Disp: , Rfl:   •  senna  "(SENOKOT) 8.6 MG tablet, Take 1 tablet by mouth Every Night., Disp: 30 tablet, Rfl: 1  •  simvastatin (ZOCOR) 20 MG tablet, Take 20 mg by mouth., Disp: , Rfl:   •  tamsulosin (FLOMAX) 0.4 MG capsule 24 hr capsule, Take 0.4 mg by mouth., Disp: , Rfl:   •  thiamine (VITAMIN B-1) 100 MG tablet, Take 100 mg by mouth Daily., Disp: , Rfl:   •  torsemide (DEMADEX) 10 MG tablet, Take 10 mg by mouth., Disp: , Rfl:     Allergies:  Patient has no known allergies.    Family Hx:  Family History   Problem Relation Age of Onset   • Hypertension Father         Social History:  Social History     Socioeconomic History   • Marital status:      Spouse name: Not on file   • Number of children: Not on file   • Years of education: Not on file   • Highest education level: Not on file   Tobacco Use   • Smoking status: Former Smoker     Types: Pipe   Substance and Sexual Activity   • Alcohol use: Never     Frequency: Never   • Drug use: Never   • Sexual activity: Not Currently       Review of Systems  Review of Systems as per HPI patient currently requires assistance with ADLs including getting dressed preparing meals although he is able to feed himself and is continent of urine currently ambulates with a walker although he currently is wheelchair-bound.  He had an appointment in podiatry this morning and came up for O2 certification.  Otherwise -12 systems review    Objective:     /70   Pulse 64   Ht 177.8 cm (70\")   Wt 79.4 kg (175 lb) Comment: pt w/c  SpO2 (!) 85% Comment: after walking down the [place]  BMI 25.11 kg/m²   Physical Exam  Alert dusky complexion HEENT remarkable for TM wasting bilaterally nares patent without flaring or discharge neck supple without JVD breath sounds are diminished throughout without wheezes rales rhonchi tactile fremitus or pleural friction rub heart regular with grade 2/6 systolic murmur heard throughout the precordium no extrasystole PMI diffuse no chest wall tenderness noted abdomen soft " nontender extremities show generalized muscle wasting without edema no tremor neuro nonfocal distal pulses are diminished patient does have slight nailbed cyanosis without clubbing neuro nonfocal no ataxia  Lab Review  Results for orders placed or performed during the hospital encounter of 10/25/20   Blood Culture - Blood, Arm, Right    Specimen: Arm, Right; Blood   Result Value Ref Range    Blood Culture Streptococcus mutans (C)     Isolated from Aerobic and Anaerobic Bottles     Gram Stain Anaerobic Bottle Gram positive cocci in chains (C)     Gram Stain Aerobic Bottle Gram positive cocci in chains (C)        Susceptibility    Streptococcus mutans - ARIADNA     Ceftriaxone <=0.12 Susceptible ug/ml     Penicillin G <=0.06 Susceptible ug/ml     Vancomycin 0.5 Susceptible ug/ml   Blood Culture - Blood, Arm, Left    Specimen: Arm, Left; Blood   Result Value Ref Range    Blood Culture Streptococcus mutans (C)     Isolated from Pediatric Bottle     Gram Stain Pediatric Bottle Gram positive cocci in chains (C)    COVID-19, [facility] IN-HOUSE, NP SWAB IN TRANSPORT MEDIA 8-10 HR TAT - Swab, Nasopharynx    Specimen: Nasopharynx; Swab   Result Value Ref Range    COVID19 Not Detected Not Detected - Ref. Range   Blood Culture ID, PCR - Blood, Arm, Right    Specimen: Arm, Right; Blood   Result Value Ref Range    BCID, PCR (C) No organism detected by BCID PCR.     Streptococcus spp, not A, B, or pneumoniae. Identification by BCID PCR.   Blood Culture - Blood, Arm, Left    Specimen: Arm, Left; Blood   Result Value Ref Range    Blood Culture No growth at 5 days    Comprehensive Metabolic Panel    Specimen: Blood   Result Value Ref Range    Glucose 191 (H) 65 - 99 mg/dL    BUN 33 (H) 8 - 23 mg/dL    Creatinine 1.91 (H) 0.76 - 1.27 mg/dL    Sodium 135 (L) 136 - 145 mmol/L    Potassium 4.5 3.5 - 5.2 mmol/L    Chloride 99 98 - 107 mmol/L    CO2 24.0 22.0 - 29.0 mmol/L    Calcium 8.5 (L) 8.6 - 10.5 mg/dL    Total Protein 6.1 6.0 - 8.5 g/dL     Albumin 3.00 (L) 3.50 - 5.20 g/dL    ALT (SGPT) 12 1 - 41 U/L    AST (SGOT) 14 1 - 40 U/L    Alkaline Phosphatase 64 39 - 117 U/L    Total Bilirubin 0.5 0.0 - 1.2 mg/dL    eGFR Non African Amer 34 (L) >60 mL/min/1.73    Globulin 3.1 gm/dL    A/G Ratio 1.0 g/dL    BUN/Creatinine Ratio 17.3 7.0 - 25.0    Anion Gap 12.0 5.0 - 15.0 mmol/L   Troponin    Specimen: Blood   Result Value Ref Range    Troponin T 0.065 (C) 0.000 - 0.030 ng/mL   Magnesium    Specimen: Blood   Result Value Ref Range    Magnesium 1.1 (L) 1.6 - 2.4 mg/dL   Urinalysis With Microscopic If Indicated (No Culture) - Urine, Clean Catch    Specimen: Urine, Clean Catch   Result Value Ref Range    Color, UA Yellow Yellow, Straw, Dark Yellow, Jolie    Appearance, UA Cloudy (A) Clear    pH, UA <=5.0 5.0 - 9.0    Specific Gravity, UA 1.012 1.003 - 1.030    Glucose, UA Negative Negative    Ketones, UA Negative Negative    Bilirubin, UA Negative Negative    Blood, UA Negative Negative    Protein,  mg/dL (2+) (A) Negative    Leuk Esterase, UA Negative Negative    Nitrite, UA Negative Negative    Urobilinogen, UA 1.0 E.U./dL 0.2 - 1.0 E.U./dL   CBC Auto Differential    Specimen: Blood   Result Value Ref Range    WBC 10.37 3.40 - 10.80 10*3/mm3    RBC 3.09 (L) 4.14 - 5.80 10*6/mm3    Hemoglobin 8.6 (L) 13.0 - 17.7 g/dL    Hematocrit 27.1 (L) 37.5 - 51.0 %    MCV 87.7 79.0 - 97.0 fL    MCH 27.8 26.6 - 33.0 pg    MCHC 31.7 31.5 - 35.7 g/dL    RDW 16.1 (H) 12.3 - 15.4 %    RDW-SD 51.7 37.0 - 54.0 fl    MPV 10.1 6.0 - 12.0 fL    Platelets 311 140 - 450 10*3/mm3    Neutrophil % 84.5 (H) 42.7 - 76.0 %    Lymphocyte % 9.6 (L) 19.6 - 45.3 %    Monocyte % 4.2 (L) 5.0 - 12.0 %    Eosinophil % 0.9 0.3 - 6.2 %    Basophil % 0.3 0.0 - 1.5 %    Immature Grans % 0.5 0.0 - 0.5 %    Neutrophils, Absolute 8.76 (H) 1.70 - 7.00 10*3/mm3    Lymphocytes, Absolute 1.00 0.70 - 3.10 10*3/mm3    Monocytes, Absolute 0.44 0.10 - 0.90 10*3/mm3    Eosinophils, Absolute 0.09 0.00 - 0.40  10*3/mm3    Basophils, Absolute 0.03 0.00 - 0.20 10*3/mm3    Immature Grans, Absolute 0.05 0.00 - 0.05 10*3/mm3    nRBC 0.0 0.0 - 0.2 /100 WBC   Lipase    Specimen: Blood   Result Value Ref Range    Lipase 18 13 - 60 U/L   aPTT    Specimen: Blood   Result Value Ref Range    PTT 35.1 20.0 - 40.3 seconds   Protime-INR    Specimen: Blood   Result Value Ref Range    Protime 15.1 11.1 - 15.3 Seconds    INR 1.14 0.80 - 1.20   Troponin    Specimen: Blood   Result Value Ref Range    Troponin T 0.068 (C) 0.000 - 0.030 ng/mL   Troponin    Specimen: Blood   Result Value Ref Range    Troponin T 0.065 (C) 0.000 - 0.030 ng/mL   BNP    Specimen: Blood   Result Value Ref Range    proBNP 8,814.0 (H) 0.0-1,800.0 pg/mL   Urinalysis, Microscopic Only - Urine, Clean Catch    Specimen: Urine, Clean Catch   Result Value Ref Range    RBC, UA None Seen None Seen /HPF    WBC, UA 3-5 None Seen, 0-2, 3-5 /HPF    Bacteria, UA Trace (A) None Seen /HPF    Squamous Epithelial Cells, UA None Seen None Seen, 0-2 /HPF    Hyaline Casts, UA 3-6 None Seen /LPF    Methodology Manual Light Microscopy    Lactic Acid, Plasma    Specimen: Blood   Result Value Ref Range    Lactate 1.0 0.5 - 2.0 mmol/L   Iron    Specimen: Blood   Result Value Ref Range    Iron 13 (L) 59 - 158 mcg/dL   TSH    Specimen: Blood   Result Value Ref Range    TSH 1.470 0.270 - 4.200 uIU/mL   CBC (No Diff)    Specimen: Blood   Result Value Ref Range    WBC 10.04 3.40 - 10.80 10*3/mm3    RBC 3.09 (L) 4.14 - 5.80 10*6/mm3    Hemoglobin 8.5 (L) 13.0 - 17.7 g/dL    Hematocrit 27.3 (L) 37.5 - 51.0 %    MCV 88.3 79.0 - 97.0 fL    MCH 27.5 26.6 - 33.0 pg    MCHC 31.1 (L) 31.5 - 35.7 g/dL    RDW 16.1 (H) 12.3 - 15.4 %    RDW-SD 51.3 37.0 - 54.0 fl    MPV 10.8 6.0 - 12.0 fL    Platelets 300 140 - 450 10*3/mm3   Basic Metabolic Panel    Specimen: Blood   Result Value Ref Range    Glucose 199 (H) 65 - 99 mg/dL    BUN 25 (H) 8 - 23 mg/dL    Creatinine 1.48 (H) 0.76 - 1.27 mg/dL    Sodium 138 136 -  145 mmol/L    Potassium 4.2 3.5 - 5.2 mmol/L    Chloride 101 98 - 107 mmol/L    CO2 24.0 22.0 - 29.0 mmol/L    Calcium 8.4 (L) 8.6 - 10.5 mg/dL    eGFR Non African Amer 46 (L) >60 mL/min/1.73    BUN/Creatinine Ratio 16.9 7.0 - 25.0    Anion Gap 13.0 5.0 - 15.0 mmol/L   Magnesium    Specimen: Blood   Result Value Ref Range    Magnesium 1.6 1.6 - 2.4 mg/dL   Occult Blood X 1, Stool - Stool, Per Rectum    Specimen: Per Rectum; Stool   Result Value Ref Range    Fecal Occult Blood Negative Negative   Basic Metabolic Panel    Specimen: Blood   Result Value Ref Range    Glucose 226 (H) 65 - 99 mg/dL    BUN 21 8 - 23 mg/dL    Creatinine 1.34 (H) 0.76 - 1.27 mg/dL    Sodium 138 136 - 145 mmol/L    Potassium 3.5 3.5 - 5.2 mmol/L    Chloride 103 98 - 107 mmol/L    CO2 22.0 22.0 - 29.0 mmol/L    Calcium 8.5 (L) 8.6 - 10.5 mg/dL    eGFR Non African Amer 51 (L) >60 mL/min/1.73    BUN/Creatinine Ratio 15.7 7.0 - 25.0    Anion Gap 13.0 5.0 - 15.0 mmol/L   CBC Auto Differential    Specimen: Blood   Result Value Ref Range    WBC 8.46 3.40 - 10.80 10*3/mm3    RBC 3.08 (L) 4.14 - 5.80 10*6/mm3    Hemoglobin 8.6 (L) 13.0 - 17.7 g/dL    Hematocrit 26.2 (L) 37.5 - 51.0 %    MCV 85.1 79.0 - 97.0 fL    MCH 27.9 26.6 - 33.0 pg    MCHC 32.8 31.5 - 35.7 g/dL    RDW 15.9 (H) 12.3 - 15.4 %    RDW-SD 48.7 37.0 - 54.0 fl    MPV 11.0 6.0 - 12.0 fL    Platelets 301 140 - 450 10*3/mm3    Neutrophil % 83.3 (H) 42.7 - 76.0 %    Lymphocyte % 8.9 (L) 19.6 - 45.3 %    Monocyte % 4.7 (L) 5.0 - 12.0 %    Eosinophil % 2.0 0.3 - 6.2 %    Basophil % 0.4 0.0 - 1.5 %    Immature Grans % 0.7 (H) 0.0 - 0.5 %    Neutrophils, Absolute 7.05 (H) 1.70 - 7.00 10*3/mm3    Lymphocytes, Absolute 0.75 0.70 - 3.10 10*3/mm3    Monocytes, Absolute 0.40 0.10 - 0.90 10*3/mm3    Eosinophils, Absolute 0.17 0.00 - 0.40 10*3/mm3    Basophils, Absolute 0.03 0.00 - 0.20 10*3/mm3    Immature Grans, Absolute 0.06 (H) 0.00 - 0.05 10*3/mm3    nRBC 0.0 0.0 - 0.2 /100 WBC   Basic Metabolic  Panel    Specimen: Blood   Result Value Ref Range    Glucose 226 (H) 65 - 99 mg/dL    BUN 16 8 - 23 mg/dL    Creatinine 1.30 (H) 0.76 - 1.27 mg/dL    Sodium 146 (H) 136 - 145 mmol/L    Potassium 3.6 3.5 - 5.2 mmol/L    Chloride 110 (H) 98 - 107 mmol/L    CO2 25.0 22.0 - 29.0 mmol/L    Calcium 8.8 8.6 - 10.5 mg/dL    eGFR Non African Amer 53 (L) >60 mL/min/1.73    BUN/Creatinine Ratio 12.3 7.0 - 25.0    Anion Gap 11.0 5.0 - 15.0 mmol/L   CBC Auto Differential    Specimen: Blood   Result Value Ref Range    WBC 8.80 3.40 - 10.80 10*3/mm3    RBC 3.18 (L) 4.14 - 5.80 10*6/mm3    Hemoglobin 8.9 (L) 13.0 - 17.7 g/dL    Hematocrit 27.5 (L) 37.5 - 51.0 %    MCV 86.5 79.0 - 97.0 fL    MCH 28.0 26.6 - 33.0 pg    MCHC 32.4 31.5 - 35.7 g/dL    RDW 15.9 (H) 12.3 - 15.4 %    RDW-SD 49.5 37.0 - 54.0 fl    MPV 10.6 6.0 - 12.0 fL    Platelets 348 140 - 450 10*3/mm3    Neutrophil % 78.9 (H) 42.7 - 76.0 %    Lymphocyte % 12.0 (L) 19.6 - 45.3 %    Monocyte % 5.3 5.0 - 12.0 %    Eosinophil % 2.6 0.3 - 6.2 %    Basophil % 0.5 0.0 - 1.5 %    Immature Grans % 0.7 (H) 0.0 - 0.5 %    Neutrophils, Absolute 6.94 1.70 - 7.00 10*3/mm3    Lymphocytes, Absolute 1.06 0.70 - 3.10 10*3/mm3    Monocytes, Absolute 0.47 0.10 - 0.90 10*3/mm3    Eosinophils, Absolute 0.23 0.00 - 0.40 10*3/mm3    Basophils, Absolute 0.04 0.00 - 0.20 10*3/mm3    Immature Grans, Absolute 0.06 (H) 0.00 - 0.05 10*3/mm3    nRBC 0.0 0.0 - 0.2 /100 WBC   Basic Metabolic Panel    Specimen: Blood   Result Value Ref Range    Glucose 205 (H) 65 - 99 mg/dL    BUN 14 8 - 23 mg/dL    Creatinine 1.49 (H) 0.76 - 1.27 mg/dL    Sodium 143 136 - 145 mmol/L    Potassium 3.5 3.5 - 5.2 mmol/L    Chloride 106 98 - 107 mmol/L    CO2 21.0 (L) 22.0 - 29.0 mmol/L    Calcium 9.1 8.6 - 10.5 mg/dL    eGFR Non African Amer 45 (L) >60 mL/min/1.73    BUN/Creatinine Ratio 9.4 7.0 - 25.0    Anion Gap 16.0 (H) 5.0 - 15.0 mmol/L   CBC Auto Differential    Specimen: Blood   Result Value Ref Range    WBC 11.43  (H) 3.40 - 10.80 10*3/mm3    RBC 3.54 (L) 4.14 - 5.80 10*6/mm3    Hemoglobin 9.8 (L) 13.0 - 17.7 g/dL    Hematocrit 30.9 (L) 37.5 - 51.0 %    MCV 87.3 79.0 - 97.0 fL    MCH 27.7 26.6 - 33.0 pg    MCHC 31.7 31.5 - 35.7 g/dL    RDW 16.4 (H) 12.3 - 15.4 %    RDW-SD 51.3 37.0 - 54.0 fl    MPV 10.5 6.0 - 12.0 fL    Platelets 391 140 - 450 10*3/mm3    Neutrophil % 80.0 (H) 42.7 - 76.0 %    Lymphocyte % 12.2 (L) 19.6 - 45.3 %    Monocyte % 4.2 (L) 5.0 - 12.0 %    Eosinophil % 2.4 0.3 - 6.2 %    Basophil % 0.3 0.0 - 1.5 %    Immature Grans % 0.9 (H) 0.0 - 0.5 %    Neutrophils, Absolute 9.13 (H) 1.70 - 7.00 10*3/mm3    Lymphocytes, Absolute 1.40 0.70 - 3.10 10*3/mm3    Monocytes, Absolute 0.48 0.10 - 0.90 10*3/mm3    Eosinophils, Absolute 0.28 0.00 - 0.40 10*3/mm3    Basophils, Absolute 0.04 0.00 - 0.20 10*3/mm3    Immature Grans, Absolute 0.10 (H) 0.00 - 0.05 10*3/mm3    nRBC 0.0 0.0 - 0.2 /100 WBC   POC Glucose Once    Specimen: Blood   Result Value Ref Range    Glucose 215 (H) 70 - 130 mg/dL   POC Glucose Once    Specimen: Blood   Result Value Ref Range    Glucose 176 (H) 70 - 130 mg/dL   POC Glucose Once    Specimen: Blood   Result Value Ref Range    Glucose 177 (H) 70 - 130 mg/dL   POC Glucose Once    Specimen: Blood   Result Value Ref Range    Glucose 199 (H) 70 - 130 mg/dL   POC Glucose Once    Specimen: Blood   Result Value Ref Range    Glucose 226 (H) 70 - 130 mg/dL   POC Glucose Once    Specimen: Blood   Result Value Ref Range    Glucose 201 (H) 70 - 130 mg/dL   POC Glucose Once    Specimen: Blood   Result Value Ref Range    Glucose 243 (H) 70 - 130 mg/dL   POC Glucose Once    Specimen: Blood   Result Value Ref Range    Glucose 233 (H) 70 - 130 mg/dL   POC Glucose Once    Specimen: Blood   Result Value Ref Range    Glucose 200 (H) 70 - 130 mg/dL   POC Glucose Once    Specimen: Blood   Result Value Ref Range    Glucose 190 (H) 70 - 130 mg/dL   POC Glucose Once    Specimen: Blood   Result Value Ref Range    Glucose  211 (H) 70 - 130 mg/dL   POC Glucose Once    Specimen: Blood   Result Value Ref Range    Glucose 220 (H) 70 - 130 mg/dL   POC Glucose Once    Specimen: Blood   Result Value Ref Range    Glucose 135 (H) 70 - 130 mg/dL   POC Glucose Once    Specimen: Blood   Result Value Ref Range    Glucose 188 (H) 70 - 130 mg/dL   POC Glucose Once    Specimen: Blood   Result Value Ref Range    Glucose 250 (H) 70 - 130 mg/dL   POC Glucose Once    Specimen: Blood   Result Value Ref Range    Glucose 216 (H) 70 - 130 mg/dL   POC Glucose Once    Specimen: Blood   Result Value Ref Range    Glucose 123 70 - 130 mg/dL   POC Glucose Once    Specimen: Blood   Result Value Ref Range    Glucose 202 (H) 70 - 130 mg/dL   POC Glucose Once    Specimen: Blood   Result Value Ref Range    Glucose 208 (H) 70 - 130 mg/dL   POC Glucose Once    Specimen: Blood   Result Value Ref Range    Glucose 222 (H) 70 - 130 mg/dL   Adult Transthoracic Echo Complete W/ Cont if Necessary Per Protocol   Result Value Ref Range    BSA 2.0 m^2    RVIDd 3.5 cm    IVSd 1.5 cm    LVIDd 4.7 cm    LVIDs 3.3 cm    LVPWd 1.9 cm    IVS/LVPW 0.78     FS 29.4 %    EDV(Teich) 102.4 ml    ESV(Teich) 44.8 ml    EF(Teich) 56.2 %    EDV(cubed) 103.8 ml    ESV(cubed) 36.6 ml    EF(cubed) 64.8 %    LV mass(C)d 341.8 grams    LV mass(C)dI 172.9 grams/m^2    SV(Teich) 57.6 ml    SI(Teich) 29.1 ml/m^2    SV(cubed) 67.2 ml    SI(cubed) 34.0 ml/m^2    Ao root diam 2.5 cm    Ao root area 4.9 cm^2    LA dimension 4.1 cm    LA/Ao 1.6     LVOT diam 2.2 cm    LVOT area 3.8 cm^2    LVOT area(traced) 3.8 cm^2    LVLd ap4 6.5 cm    EDV(MOD-sp4) 71.6 ml    LVLs ap4 5.6 cm    ESV(MOD-sp4) 27.9 ml    EF(MOD-sp4) 61.0 %    LVLd ap2 6.9 cm    EDV(MOD-sp2) 85.8 ml    LVLs ap2 5.4 cm    ESV(MOD-sp2) 30.9 ml    EF(MOD-sp2) 64.0 %    SV(MOD-sp4) 43.7 ml    SI(MOD-sp4) 22.1 ml/m^2    SV(MOD-sp2) 54.9 ml    SI(MOD-sp2) 27.8 ml/m^2    Ao root area (BSA corrected) 1.3     LV Alejandro Vol (BSA corrected) 36.2 ml/m^2     LV Sys Vol (BSA corrected) 14.1 ml/m^2    MV E max jr 171.0 cm/sec    MV A max jr 73.5 cm/sec    MV E/A 2.3     MV V2 max 203.0 cm/sec    MV max PG 16.5 mmHg    MV V2 mean 129.0 cm/sec    MV mean PG 8.0 mmHg    MV V2 VTI 54.9 cm    MVA(VTI) 1.6 cm^2    MV P1/2t max jr 182.0 cm/sec    MV P1/2t 60.8 msec    MVA(P1/2t) 3.6 cm^2    MV dec slope 877.0 cm/sec^2    Ao pk jr 427.0 cm/sec    Ao max PG 72.9 mmHg    Ao max PG (full) 69.1 mmHg    Ao V2 mean 327.0 cm/sec    Ao mean PG 47.0 mmHg    Ao mean PG (full) 45.0 mmHg    Ao V2 VTI 93.9 cm    TRISTA(I,A) 0.94 cm^2    TRISTA(I,D) 0.94 cm^2    TRISTA(V,A) 0.87 cm^2    TRISTA(V,D) 0.87 cm^2    AI max jr 519.0 cm/sec    AI max .7 mmHg    AI dec slope 936.5 cm/sec^2    AI P1/2t 162.3 msec    LV V1 max PG 3.8 mmHg    LV V1 mean PG 2.0 mmHg    LV V1 max 97.9 cm/sec    LV V1 mean 63.8 cm/sec    LV V1 VTI 23.1 cm    MR max jr 541.0 cm/sec    MR max .1 mmHg    SV(Ao) 460.9 ml    SI(Ao) 233.2 ml/m^2    SV(LVOT) 87.8 ml    SI(LVOT) 44.4 ml/m^2    PA V2 max 80.8 cm/sec    PA max PG 2.6 mmHg    PA max PG (full) 0.4 mmHg    RV V1 max PG 2.2 mmHg    RV V1 mean PG 1.0 mmHg    RV V1 max 74.4 cm/sec    RV V1 mean 48.5 cm/sec    RV V1 VTI 13.2 cm    TR max jr 347.0 cm/sec    RVSP(TR) 58.2 mmHg    RAP systole 10.0 mmHg    MVA P1/2T LCG 1.2 cm^2    BH CV ECHO BROOK - BZI_BMI 25.3 kilograms/m^2    BH CV ECHO BROOK - BSA(HAYCOCK) 2.0 m^2    BH CV ECHO BROOK - BZI_METRIC_WEIGHT 79.8 kg    BH CV ECHO BROOK - BZI_METRIC_HEIGHT 177.8 cm    Target HR (85%) 119 bpm    Max. Pred. HR (100%) 140 bpm    Echo EF Estimated 61 %   Type & Screen    Specimen: Blood   Result Value Ref Range    ABO Type A     RH type Positive     Antibody Screen Negative     T&S Expiration Date 10/28/2020 11:59:59 PM    PREVIOUS HISTORY    Specimen: Blood   Result Value Ref Range    Previous History Previous Record on File    Light Blue Top   Result Value Ref Range    Extra Tube hold for add-on    Green Top (Gel)   Result  Value Ref Range    Extra Tube Hold for add-ons.    Lavender Top   Result Value Ref Range    Extra Tube hold for add-on    Gold Top - SST   Result Value Ref Range    Extra Tube Hold for add-ons.             Assessment:     Diagnoses and all orders for this visit:    1. COPD with exacerbation (CMS/[facility]) (Primary)    2. Hypoxia    3. Type 2 diabetes mellitus with other specified complication, without long-term current use of insulin (CMS/[facility])    4. Dementia without behavioral disturbance, unspecified dementia type (CMS/[facility])    5. Aortic valve stenosis, etiology of cardiac valve disease unspecified    6. Shortness of breath    7. Stage 3 chronic kidney disease, unspecified whether stage 3a or 3b CKD        Plan:     Plan with ambulation his O2 sat dropped down to 85% on room air patient is given referral for home oxygen therapy would recommend starting him out at 2 L and titrating according to his O2 sats patient already has an appointment with his primary care physician next week and they are planning to leave for [place] this weekend home health is coming out to see him along with home PT recheck otherwise as needed            This document has been electronically signed by Marlon Erazo MD on November 12, 2020 11:34 CST

## 2020-11-12 NOTE — OUTREACH NOTE
COPD/PN Week 2 Survey      Responses   [facility] patient discharged from?  [place]   Does the patient have one of the following disease processes/diagnoses(primary or secondary)?  COPD/Pneumonia   Was the primary reason for admission:  Pneumonia   Week 2 attempt successful?  Yes   Call start time  1704   Call end time  1707   List who call center can speak with  wife   Person spoke with today (if not patient) and relationship  wfie   Meds reviewed with patient/caregiver?  Yes   Is the patient having any side effects they believe may be caused by any medication additions or changes?  No   Does the patient have all medications ordered at discharge?  Yes   Is the patient taking all medications as directed (includes completed medication regime)?  Yes   Does the patient have a primary care provider?   Yes   Comments regarding PCP  Patient will see PCP once back in [place].  they are aware of patient's situation and are working to getting patient home and supplied with O2 needs.    Has the patient kept scheduled appointments due by today?  Yes   Comments  Patient saw Dr. Lawrence.     What is the Home health agency?   [facility]   Home health comments  PT present at time of call   Pulse Ox monitoring  None   Psychosocial issues?  No   Week 2 call completed?  Yes   Wrap up additional comments  Patient is currently staying with daughter in [place], but plan to head back to [place].  They are trying to work out some options regarding DME.            Adri Smalls RN

## 2020-11-13 ENCOUNTER — TELEPHONE (OUTPATIENT)
Dept: FAMILY MEDICINE CLINIC | Facility: CLINIC | Age: 80
End: 2020-11-13

## 2020-11-13 NOTE — TELEPHONE ENCOUNTER
[employer] CALLED TO LET US KNOW THAT THEY SENT A PATIENT ORDER REFERRAL FORM TO OUR OFFICE TODAY.      THEY SAID THAT WHEN THEY TRIED TO UPDATE THE ADDRESS ON THE FORM THEY WERE UNSUCCESSFUL AND WANTED TO KNOW IF WE COULD JUST JOSHUA OUT THE ADDRESS ON THE FORM AND WRITE IN THE CORRECT ADDRESS.     CALL BACK NUMBER [phone]  [phone]    THANK YOU,  DEL

## 2020-11-16 ENCOUNTER — TELEPHONE (OUTPATIENT)
Dept: FAMILY MEDICINE CLINIC | Facility: CLINIC | Age: 80
End: 2020-11-16

## 2020-11-16 NOTE — TELEPHONE ENCOUNTER
[employer] CALLED AND LEFT A VOICEMAIL AND HAS A QUESTION REGARDING THE FORMS THAT WAS FAXED BACK TO THEM. THE NUMBER [phone].      THANK YOU,        WOOD